# Patient Record
Sex: FEMALE | Race: OTHER | Employment: OTHER | URBAN - METROPOLITAN AREA
[De-identification: names, ages, dates, MRNs, and addresses within clinical notes are randomized per-mention and may not be internally consistent; named-entity substitution may affect disease eponyms.]

---

## 2017-02-06 RX ORDER — SPIRONOLACTONE 25 MG/1
TABLET ORAL
Qty: 30 TAB | Refills: 5 | Status: SHIPPED | OUTPATIENT
Start: 2017-02-06 | End: 2017-03-06 | Stop reason: SDUPTHER

## 2017-02-10 RX ORDER — FUROSEMIDE 40 MG/1
TABLET ORAL
Qty: 60 TAB | Refills: 5 | Status: SHIPPED | OUTPATIENT
Start: 2017-02-10 | End: 2017-08-05 | Stop reason: SDUPTHER

## 2017-03-06 RX ORDER — SPIRONOLACTONE 25 MG/1
TABLET ORAL
Qty: 30 TAB | Refills: 5 | Status: SHIPPED | OUTPATIENT
Start: 2017-03-06 | End: 2018-03-01 | Stop reason: SDUPTHER

## 2017-04-13 ENCOUNTER — HOSPITAL ENCOUNTER (OUTPATIENT)
Dept: LAB | Age: 82
Discharge: HOME | End: 2017-04-13
Payer: MEDICARE

## 2017-04-13 ENCOUNTER — OFFICE VISIT (OUTPATIENT)
Dept: CARDIOLOGY CLINIC | Age: 82
End: 2017-04-13

## 2017-04-13 ENCOUNTER — CLINICAL SUPPORT (OUTPATIENT)
Dept: CARDIOLOGY CLINIC | Age: 82
End: 2017-04-13

## 2017-04-13 VITALS
SYSTOLIC BLOOD PRESSURE: 144 MMHG | HEART RATE: 62 BPM | WEIGHT: 104 LBS | OXYGEN SATURATION: 92 % | BODY MASS INDEX: 21.83 KG/M2 | HEIGHT: 58 IN | DIASTOLIC BLOOD PRESSURE: 64 MMHG

## 2017-04-13 DIAGNOSIS — Z95.0 CARDIAC PACEMAKER IN SITU: ICD-10-CM

## 2017-04-13 DIAGNOSIS — R53.83 FATIGUE, UNSPECIFIED TYPE: ICD-10-CM

## 2017-04-13 DIAGNOSIS — R06.02 SHORTNESS OF BREATH: ICD-10-CM

## 2017-04-13 DIAGNOSIS — I49.5 SSS (SICK SINUS SYNDROME) (HCC): ICD-10-CM

## 2017-04-13 DIAGNOSIS — I10 ESSENTIAL HYPERTENSION: Primary | ICD-10-CM

## 2017-04-13 DIAGNOSIS — I50.32 CHRONIC DIASTOLIC CONGESTIVE HEART FAILURE (HCC): ICD-10-CM

## 2017-04-13 DIAGNOSIS — I49.5 SSS (SICK SINUS SYNDROME) (HCC): Primary | ICD-10-CM

## 2017-04-13 LAB
ANION GAP BLD CALC-SCNC: 11 MMOL/L (ref 3–18)
BNP SERPL-MCNC: 3035 PG/ML (ref 0–1800)
BUN SERPL-MCNC: 31 MG/DL (ref 7–18)
BUN/CREAT SERPL: 25 (ref 12–20)
CALCIUM SERPL-MCNC: 9 MG/DL (ref 8.5–10.1)
CHLORIDE SERPL-SCNC: 97 MMOL/L (ref 100–108)
CO2 SERPL-SCNC: 28 MMOL/L (ref 21–32)
CREAT SERPL-MCNC: 1.25 MG/DL (ref 0.6–1.3)
GLUCOSE SERPL-MCNC: 263 MG/DL (ref 74–99)
POTASSIUM SERPL-SCNC: 4.9 MMOL/L (ref 3.5–5.5)
SODIUM SERPL-SCNC: 136 MMOL/L (ref 136–145)

## 2017-04-13 PROCEDURE — 36415 COLL VENOUS BLD VENIPUNCTURE: CPT | Performed by: NURSE PRACTITIONER

## 2017-04-13 PROCEDURE — 83880 ASSAY OF NATRIURETIC PEPTIDE: CPT | Performed by: NURSE PRACTITIONER

## 2017-04-13 PROCEDURE — 80048 BASIC METABOLIC PNL TOTAL CA: CPT | Performed by: NURSE PRACTITIONER

## 2017-04-13 RX ORDER — POTASSIUM CHLORIDE 750 MG/1
10 TABLET, FILM COATED, EXTENDED RELEASE ORAL DAILY
Qty: 1 TAB | Refills: 0
Start: 2017-04-13

## 2017-04-13 NOTE — PATIENT INSTRUCTIONS
Take Lasix (furosemide) 80mg in the morning and 40mg in the evening for 2 days and then back to 40mg twice a day  Take potassium 10meq twice a day for 2 days only and then back to 1 tablet daily   All other medications to remain the same  BMP, NT pro-BNP   Follow-up with Prashanth Banegas in one week  Follow-up with Dr. Adonis Aguirre as scheduled and as needed  Weigh daily and record  Limit sodium intake to 2000mg per day  Limit fluid intake to no more than  6, eight ounce glasses of any type of fluids per day  Call if you notice sudden or progressive weight gain (3-5 pounds in 2-3 days), increasing shortness of breath, abdominal bloating, increasing lower extremity edema, inability to lie flat or on your normal number of pillows, having to sit up to catch your breath, fatigue, increased somnolence (sleeping more), poor appetite

## 2017-04-13 NOTE — PROGRESS NOTES
Herber Oates presents today for follow-up. She was supposed to follow-up with Dr. Abel Reina in 6 months but due to some scheduling conflicts this was not possible and therefore she was placed on my schedule. She was accompanied to the office by her daughter who states that Mrs. Danny Patiño has not been feeling well since this past weekend. They thought that maybe she had a cold but they have noticed some increased lower extremity edema, abdominal bloating, and a nonproductive cough. She is an 80year old female with a history of atrial fibrillation with RVR, hypertension, hyperlipidemia, and diabetes. She has a history of a syncopal episode believed to be due symptomatic bradycardia related to a combination of digoxin and atenolol which was ultimately discontinued. Prior to that, she had episodes of atrial fibrillation with rapid ventricular response which was very responsive to digoxin but it also made her bradycardic with a pause as long as 7.4 seconds noted on Holter. She refused implantable pacemaker for some time but then finally agreed and it ([employer]tronic [place] single chamber pacemaker was implanted on Oct. 31, 2013). He wore a Holter in August 2016 and showed atrial flutter 100% of recording time. Her last echocardiogram was done in November 2015 which showed a ejection fraction 55%, no wall motion abnormalities, and an RVSP between 40 and 45 mmHg. She was last seen by Dr. Abel Reina in August 2016. She denies chest pain, tightness, heaviness, and palpitations. She denies shortness of breath at rest, admits to minimal dyspnea on exertion, denies orthopnea and PND. She admits to abdominal bloating. She denies lightheadedness, dizziness, and syncope. She admits to have lower extremity edema. She denies claudication. Denies nausea, vomiting, diarrhea, fever, chills.          Past Medical History:   Diagnosis Date    Anemia     Arrhythmia     Atrial Fibrillation     Atrial fibrillation ([facility] pt refused anticoagulation    CHF (congestive heart failure) (HCC)     Coronary artery disease     Diabetes ([facility]     Diabetes mellitus ([facility]     Echocardiogram abnormal 11/20/2015    EF 55%. No WMA. Indeterminate diastolic fx. RVSP 40-45 mmHg. Mod LAE. Mild-mod NOMI. Mild-mod MR. Mod TR.      Holter monitor, abnormal 07/05/2013    Abnormal 48-hr Holter study. Atrial fibrillation w/slow ventricular rate, avg HR 50 bpm.  Hundreds of pauses > 3.5 sec, longest 7.4 sec.  HTN (hypertension)     Hyperlipidemia     Lower extremity venous duplex 12/16/2014    No DVT bilaterally. Bilateral pulsatile flow c/w elevated central venous pressure.  Paroxysmal atrial fibrillation (HCC)     [facility]     S/P cardiac pacemaker procedure 10-31-13    Implantation of MEDTRONIC single-chamber permanent pacemaker    Syncope 2/28/2011     Past Surgical History:   Procedure Laterality Date    HX HYSTERECTOMY      HX PACEMAKER       Family History   Problem Relation Age of Onset    Heart Attack Father      Social History   Substance Use Topics    Smoking status: Never Smoker    Smokeless tobacco: Never Used    Alcohol use No       No Known Allergies      Physical:  Visit Vitals    /64 (BP 1 Location: Left arm, BP Patient Position: Sitting)    Pulse 62    Ht 4' 10\" (1.473 m)    Wt 47.2 kg (104 lb)    SpO2 92%    BMI 21.74 kg/m2       Her weight is up 6 pounds since her last visit. Neck:  Supple, no JVD, no carotid bruits  CV:  Normal S1 and  S2, grade II-III/VI PHOEBE noted, no rubs or gallops noted. Lungs: Breath sounds are clear bilaterally, no rales or wheezing  Abd:  Soft, non-tender, slightly distended with good bowel sounds.   No hepatosplenomegaly  Extremities:  1+ lower extremity  edema from her shins to her feet      EKG:   Probable underlying afibrillation with demand ventricular pacing.  Intrinsic QRS with non-specific ST/T abnormality      LABS:  Lab Results   Component Value Date/Time    Sodium 134 02/24/2016 05:34 PM    Potassium 4.5 02/24/2016 05:34 PM    Chloride 99 02/24/2016 05:34 PM    CO2 27 02/24/2016 05:34 PM    Glucose 202 02/24/2016 05:34 PM    BUN 39 02/24/2016 05:34 PM    Creatinine 1.15 02/24/2016 05:34 PM     Lab Results   Component Value Date/Time    Cholesterol, total 222 02/24/2016 05:48 PM    HDL Cholesterol 48 02/24/2016 05:48 PM    LDL, calculated  02/24/2016 05:48 PM     LDL AND VLDL CHOLESTEROL NOT CALCULATED WHEN TRIGLYCERIDES >400 MG/DL OR HDL CHOLESTEROL <20 MG/DL    Triglyceride 450 02/24/2016 05:48 PM    CHOL/HDL Ratio 4.6 02/24/2016 05:48 PM     Impression / Plan:  1. Atrial fibrillation, chronic  2. Sick sinus syndrome with tachy/bryan manifestations, s/p permanent pacemaker implant on 10/31/13  3. Hypertension, blood pressure stable   4. Diabetes, recommend Hgb A1c less than 7% from cardiac standpoint  5. CHF, acute on chronic diastolic    Mrs. Estefania Stevenson was seen today an overdue 6 month follow-up. She was supposed to follow-up with Dr. Krishna Jo in 6 months but due to some scheduling conflicts this was not possible and therefore she was placed on my schedule. She was accompanied to the office by her daughter who states that Mrs. Estefania Stevenson has not been feeling well since this past weekend. They thought that maybe she had a cold but they have noticed some increased lower extremity edema, abdominal bloating, and a nonproductive cough. Her weight is up 6 pounds since her previous visit. She complains of only occasional dyspnea on exertion and denies orthopnea and PND. She does admit to some abdominal bloating and lower extremity edema. Her blood pressure and heart rate are well controlled. Her breath sounds are clear and she does exhibit some lower extremity edema. Her device was interrogated today and it showed that she has had 337 ventricular high rate episodes since August 2016.   These episodes were of short duration and were noted to be atrial fibrillation. She was instructed to take Lasix 80 mg in the morning and 40 mg in the evening for 2 days only and then resume 40 mg twice a day. She states that she has only been taking potassium 10 mEq once a day so she was instructed to increase this to 10 mEq twice a day for 2 days only and then back to her routine schedule. She was given a lab slip to have a BMP and NT proBNP done today. She was scheduled to follow-up with me in 1 week. Congestive heart failure teaching reinforced today. Advised to limit sodium intake to no more than 2000mg per day and to also watch fluid intake. Advised to weigh daily every morning and record weights. Instructed to call our office if progressive weight gain is noted over a 2 to 3 day period of time, shortness of breath increases, or if abdominal bloating, nausea, fatigue, or increased lower extremity edema is noted. She is aware that she should be weighing daily but her daughter is not sure that this is being done. She will follow-up with Dr. Tina Lin as scheduled and PRN.       Cortney Craft MSN, FNP-BC

## 2017-04-13 NOTE — MR AVS SNAPSHOT
Visit Information Date & Time Provider Department Dept. Phone Encounter #  
 4/13/2017  3:30 PM Ayala Landrum NP [facility] [address] [id number] Your Appointments 4/21/2017  3:00 PM  
Follow Up with Ayala Landrum NP [facility] ([address]) Appt Note: 1 week f/u CHF  
 [place] [id number]  
[phone] [address] [facility] [address] 6/19/2017  9:20 AM  
Follow Up with Dhara So DO [facility] ([address]) Appt Note: dx f/u from [facility] visit [place] [id number] [phone] [address] [facility] [address] Upcoming Health Maintenance Date Due  
 FOOT EXAM Q1 11/26/1940 MICROALBUMIN Q1 11/26/1940 EYE EXAM RETINAL OR DILATED Q1 11/26/1940 DTaP/Tdap/Td series (1 - Tdap) 11/26/1951 ZOSTER VACCINE AGE 60> 11/26/1990 GLAUCOMA SCREENING Q2Y 11/26/1995 OSTEOPOROSIS SCREENING (DEXA) 11/26/1995 Pneumococcal 65+ Low/Medium Risk (1 of 2 - PCV13) 11/26/1995 MEDICARE YEARLY EXAM 11/26/1995 INFLUENZA AGE 9 TO ADULT 8/1/2016 HEMOGLOBIN A1C Q6M 8/24/2016 LIPID PANEL Q1 2/24/2017 Allergies as of 4/13/2017  Review Complete On: 4/13/2017 By: Ayala Landrum NP No Known Allergies Current Immunizations  Never Reviewed No immunizations on file. Not reviewed this visit You Were Diagnosed With   
  
 Codes Comments Essential hypertension    -  Primary ICD-10-CM: I10 
ICD-9-CM: 401.9 Chronic diastolic congestive heart failure (HCC)     ICD-10-CM: I50.32 
ICD-9-CM: 428.32, 428.0 SSS (sick sinus syndrome) (HCC)     ICD-10-CM: I49.5 ICD-9-CM: 427.81 Cardiac pacemaker in situ     ICD-10-CM: Z95.0 ICD-9-CM: V45.01   
 Fatigue, unspecified type     ICD-10-CM: R53.83 ICD-9-CM: 780.79 Shortness of breath     ICD-10-CM: R06.02 
ICD-9-CM: 786.05 Vitals BP Pulse Height(growth percentile) Weight(growth percentile) SpO2 BMI  
 144/64 (BP 1 Location: Left arm, BP Patient Position: Sitting) 62 4' 10\" (1.473 m) 104 lb (47.2 kg) 92% 21.74 kg/m2 OB Status Smoking Status Hysterectomy Never Smoker BMI and BSA Data Body Mass Index Body Surface Area 21.74 kg/m 2 1.39 m 2 Preferred Pharmacy Pharmacy Name Phone [facility] [address] [facility] [phone] Your Updated Medication List  
  
   
This list is accurate as of: 4/13/17  4:26 PM.  Always use your most recent med list.  
  
  
  
  
 aspirin 81 mg chewable tablet Take one tab daily-START 34/3/27  
  
 folic acid 1 mg tablet Commonly known as:  Google Take 1 mg by mouth daily. furosemide 40 mg tablet Commonly known as:  LASIX TAKE ONE TABLET BY MOUTH TWICE A DAY  
  
 metoprolol tartrate 50 mg tablet Commonly known as:  LOPRESSOR  
TAKE ONE TABLET BY MOUTH TWICE A DAY  
  
 potassium chloride SR 10 mEq tablet Commonly known as:  KLOR-CON 10 Take 1 Tab by mouth daily. PREMARIN 0.3 mg tablet Generic drug:  conjugated estrogens Take 0.3 mg by mouth daily. spironolactone 25 mg tablet Commonly known as:  ALDACTONE  
TAKE ONE TABLET BY MOUTH DAILY  
  
 TRADJENTA 5 mg tablet Generic drug:  linagliptin Take 5 mg by mouth daily. We Performed the Following AMB POC EKG ROUTINE W/ 12 LEADS, INTER & REP [49325 CPT(R)] To-Do List   
 Around 04/13/2017 Lab:  METABOLIC PANEL, BASIC Around 04/13/2017 Lab:  PRO-BNP Patient Instructions Take Lasix (furosemide) 80mg in the morning and 40mg in the evening for 2 days and then back to 40mg twice a day Take potassium 10meq twice a day for 2 days only and then back to 1 tablet daily All other medications to remain the same BMP, NT pro-BNP Follow-up with Keesha Pedraza in one week Follow-up with Dr. Юлия Amaya as scheduled and as needed Weigh daily and record Limit sodium intake to 2000mg per day Limit fluid intake to no more than  6, eight ounce glasses of any type of fluids per day Call if you notice sudden or progressive weight gain (3-5 pounds in 2-3 days), increasing shortness of breath, abdominal bloating, increasing lower extremity edema, inability to lie flat or on your normal number of pillows, having to sit up to catch your breath, fatigue, increased somnolence (sleeping more), poor appetite Introducing [facility]! [employer] introduces [employer] patient portal. Now you can access parts of your medical record, email your doctor's office, and request medication refills online. 1. In your internet browser, go to [email or web address] 2. Click on the First Time User? Click Here link in the Sign In box. You will see the New Member Sign Up page. 3. Enter your [employer] Access Code exactly as it appears below. You will not need to use this code after youve completed the sign-up process. If you do not sign up before the expiration date, you must request a new code. · [employer] Access Code: [email or web address] Expires: 7/12/2017  4:16 PM 
 
4. Enter the last four digits of your Social Security Number (xxxx) and Date of Birth (mm/dd/yyyy) as indicated and click Submit. You will be taken to the next sign-up page. 5. Create a [employer] ID. This will be your [employer] login ID and cannot be changed, so think of one that is secure and easy to remember. 6. Create a [employer] password. You can change your password at any time. 7. Enter your Password Reset Question and Answer. This can be used at a later time if you forget your password. 8. Enter your e-mail address. You will receive e-mail notification when new information is available in [address] 9. Click Sign Up. You can now view and download portions of your medical record. 10. Click the Download Summary menu link to download a portable copy of your medical information. If you have questions, please visit the Frequently Asked Questions section of the [employer] website. Remember, [employer] is NOT to be used for urgent needs. For medical emergencies, dial 911. Now available from your iPhone and Android! Please provide this summary of care documentation to your next provider. Your primary care clinician is listed as Jaquelin Yu. If you have any questions after today's visit, please call [phone].

## 2017-04-13 NOTE — PROGRESS NOTES
1. Have you been to the ER, urgent care clinic since your last visit? Hospitalized since your last visit? No    2. Have you seen or consulted any other health care providers outside of the [address] [place] [address] since your last visit? Include any pap smears or colon screening.  No

## 2017-04-17 NOTE — PROGRESS NOTES
I have personally seen and evaluated the device findings. Interrogation reviewed and I agree with assessment.     Regina Fortune

## 2017-04-21 ENCOUNTER — OFFICE VISIT (OUTPATIENT)
Dept: CARDIOLOGY CLINIC | Age: 82
End: 2017-04-21

## 2017-04-21 VITALS
BODY MASS INDEX: 20.99 KG/M2 | OXYGEN SATURATION: 97 % | HEIGHT: 58 IN | WEIGHT: 100 LBS | DIASTOLIC BLOOD PRESSURE: 62 MMHG | SYSTOLIC BLOOD PRESSURE: 126 MMHG | HEART RATE: 63 BPM

## 2017-04-21 DIAGNOSIS — I50.32 CHRONIC DIASTOLIC CONGESTIVE HEART FAILURE (HCC): Primary | ICD-10-CM

## 2017-04-21 NOTE — PATIENT INSTRUCTIONS
Continue present medication regimen  Lasix 40mg twice a day (as previously taking)  Follow-up with Dr. Roc Ye as scheduled and as needed  Weigh daily and record  Limit sodium intake to 2000mg per day  Limit fluid intake to no more than  6, eight ounce glasses of any type of fluids per day  Call if you notice sudden or progressive weight gain (3-5 pounds in 2-3 days), increasing shortness of breath, abdominal bloating, increasing lower extremity edema, inability to lie flat or on your normal number of pillows, having to sit up to catch your breath, fatigue, increased somnolence (sleeping more), poor appetite

## 2017-04-21 NOTE — PROGRESS NOTES
1. Have you been to the ER, urgent care clinic since your last visit? Hospitalized since your last visit? No     2. Have you seen or consulted any other health care providers outside of the [address] [place] [address] since your last visit? Include any pap smears or colon screening.  No

## 2017-04-21 NOTE — PROGRESS NOTES
Medardo Joshua presents today for follow-up after her Lasix was increased to 80mg in the morning and 40mg in the evening for 2 days only and then back to 40mg BID. When seen last week, she complained of a non-productive cough, increased lower extremity edema, and abdominal bloating. Also, according to our scale, her weight was up 6 pounds since her previous visit. She was asked to have labs done during that visit and her NT pro-BNP was 3035 and her BUN 31, creatinine 1.25, potasium was 4.9. She is an 80year old female with a history of atrial fibrillation with RVR, hypertension, hyperlipidemia, and diabetes. She has a history of a syncopal episode believed to be due symptomatic bradycardia related to a combination of digoxin and atenolol which was ultimately discontinued. Prior to that, she had episodes of atrial fibrillation with rapid ventricular response which was very responsive to digoxin but it also made her bradycardic with a pause as long as 7.4 seconds noted on Holter. She refused implantable pacemaker for some time but then finally agreed and it ([employer] [place] single chamber pacemaker was implanted on Oct. 31, 2013). He wore a Holter in August 2016 and showed atrial flutter 100% of recording time. Her last echocardiogram was done in November 2015 which showed a ejection fraction 55%, no wall motion abnormalities, and an RVSP between 40 and 45 mmHg. She was last seen by Dr. Evangelina Glass in August 2016. Today, she states that she is feeling much better. She was accompanied by her daughter with whom she lives and she agrees that her mother is doing much better. She denies chest pain, tightness, heaviness, and palpitations. She denies shortness of breath at rest, dyspnea on exertion, denies orthopnea and PND. The cough has resolved. She states that the abdominal bloating has markedly improved. She denies lightheadedness, dizziness, and syncope.    She admits to have lower extremity edema which also has markedly improved. She denies claudication. Denies nausea, vomiting, diarrhea, fever, chills. Past Medical History:   Diagnosis Date    Anemia     Arrhythmia     Atrial Fibrillation     Atrial fibrillation [facility])     pt refused anticoagulation    CHF (congestive heart failure) (HCC)     Coronary artery disease     Diabetes ([facility]     Diabetes mellitus ([facility]     Echocardiogram abnormal 11/20/2015    EF 55%. No WMA. Indeterminate diastolic fx. RVSP 40-45 mmHg. Mod LAE. Mild-mod NOMI. Mild-mod MR. Mod TR.      Holter monitor, abnormal 07/05/2013    Abnormal 48-hr Holter study. Atrial fibrillation w/slow ventricular rate, avg HR 50 bpm.  Hundreds of pauses > 3.5 sec, longest 7.4 sec.  HTN (hypertension)     Hyperlipidemia     Lower extremity venous duplex 12/16/2014    No DVT bilaterally. Bilateral pulsatile flow c/w elevated central venous pressure.  Paroxysmal atrial fibrillation (HCC)     Pulmonary HTN ([facility]     S/P cardiac pacemaker procedure 10-31-13    Implantation of MEDTRONIC single-chamber permanent pacemaker    Syncope 2/28/2011     Past Surgical History:   Procedure Laterality Date    HX HYSTERECTOMY      HX PACEMAKER       Family History   Problem Relation Age of Onset    Heart Attack Father      Social History   Substance Use Topics    Smoking status: Never Smoker    Smokeless tobacco: Never Used    Alcohol use No       No Known Allergies      Physical:  Visit Vitals    /62    Pulse 63    Ht 4' 10\" (1.473 m)    Wt 45.4 kg (100 lb)    SpO2 97%    BMI 20.9 kg/m2       Her weight is down 4 pounds since her last visit. Neck:  Supple, no JVD, no carotid bruits  CV:  Normal S1 and  S2, grade II-III/VI PHOEBE noted, no rubs or gallops noted. Lungs: Breath sounds are clear bilaterally, no rales or wheezing  Abd:  Soft, non-tender, slightly distended with good bowel sounds.   No hepatosplenomegaly  Extremities:  Trace lower extremity  edema from her ankles to her feet      EKG:   Not done today    LABS:  Lab Results   Component Value Date/Time    Sodium 136 04/13/2017 04:55 PM    Potassium 4.9 04/13/2017 04:55 PM    Chloride 97 04/13/2017 04:55 PM    CO2 28 04/13/2017 04:55 PM    Glucose 263 04/13/2017 04:55 PM    BUN 31 04/13/2017 04:55 PM    Creatinine 1.25 04/13/2017 04:55 PM     Lab Results   Component Value Date/Time    Cholesterol, total 222 02/24/2016 05:48 PM    HDL Cholesterol 48 02/24/2016 05:48 PM    LDL, calculated  02/24/2016 05:48 PM     LDL AND VLDL CHOLESTEROL NOT CALCULATED WHEN TRIGLYCERIDES >400 MG/DL OR HDL CHOLESTEROL <20 MG/DL    Triglyceride 450 02/24/2016 05:48 PM    CHOL/HDL Ratio 4.6 02/24/2016 05:48 PM     Impression / Plan:  1. Atrial fibrillation, chronic  2. Sick sinus syndrome with tachy/bryan manifestations, s/p permanent pacemaker implant on 10/31/13  3. Hypertension, blood pressure stable   4. Diabetes, recommend Hgb A1c less than 7% from cardiac standpoint  5. CHF, acute on chronic diastolic, much improved    Mrs. Kusum Perez was seen today follow-up of CHF after being seen last week for complaints of shortness of breath, abdominal bloating, non-productive cough, and increased lower extremity edema. During that visit, she was instructed to take Lasix 80mg QAM and 40mg QPM for 2 days only and then back to Lasix 40mg BID. Her weight is now down 4 pounds since her previous visit. The dyspnea on exertion has improved and she denies orthopnea and PND. She states that the abdominal bloating and lower extremity edema have markedly improved. Her blood pressure and heart rate are well controlled. Her breath sounds are clear. She took the increased dose of Lasix as prescribed and is now back to taking Lasix 40mg BID. I asked that she remain on the lasix 40mg BID along with the remainder of her medications. Congestive heart failure teaching reinforced today.   Advised to limit sodium intake to no more than 2000mg per day and to also watch fluid intake. Advised to weigh daily every morning and record weights. Instructed to call our office if progressive weight gain is noted over a 2 to 3 day period of time, shortness of breath increases, or if abdominal bloating, nausea, fatigue, or increased lower extremity edema is noted. I asked that they notify us if they notice the symptoms returning. An echo will be ordered at that time. Her last echo was done in November 2015. She will follow-up with Dr. Evangelina Glass as scheduled and PRN.       Erich Moya MSN, FNP-BC

## 2017-04-21 NOTE — MR AVS SNAPSHOT
Visit Information Date & Time Provider Department Dept. Phone Encounter #  
 4/21/2017  3:00 PM Bret Hammer, Matt De Jesus [facility] [address] [id number] Your Appointments 6/23/2017  1:00 PM  
Follow Up with Radha Card DO [facility] ([facility]) Appt Note: dx f/u from 6mos visit; 6 month f/up [place] Funes [id number] [phone] [address] [place] [address] [address] Upcoming Health Maintenance Date Due  
 FOOT EXAM Q1 11/26/1940 MICROALBUMIN Q1 11/26/1940 EYE EXAM RETINAL OR DILATED Q1 11/26/1940 DTaP/Tdap/Td series (1 - Tdap) 11/26/1951 ZOSTER VACCINE AGE 60> 11/26/1990 GLAUCOMA SCREENING Q2Y 11/26/1995 OSTEOPOROSIS SCREENING (DEXA) 11/26/1995 Pneumococcal 65+ Low/Medium Risk (1 of 2 - PCV13) 11/26/1995 MEDICARE YEARLY EXAM 11/26/1995 INFLUENZA AGE 9 TO ADULT 8/1/2016 HEMOGLOBIN A1C Q6M 8/24/2016 LIPID PANEL Q1 2/24/2017 Allergies as of 4/21/2017  Review Complete On: 4/21/2017 By: Bret Hammer NP No Known Allergies Current Immunizations  Never Reviewed No immunizations on file. Not reviewed this visit You Were Diagnosed With   
  
 Codes Comments Chronic diastolic congestive heart failure (HCC)    -  Primary ICD-10-CM: I50.32 
ICD-9-CM: 428.32, 428.0 Vitals BP Pulse Height(growth percentile) Weight(growth percentile) SpO2 BMI  
 126/62 63 4' 10\" (1.473 m) 100 lb (45.4 kg) 97% 20.9 kg/m2 OB Status Smoking Status Hysterectomy Never Smoker Vitals History BMI and BSA Data Body Mass Index Body Surface Area  
 20.9 kg/m 2 1.36 m 2 Preferred Pharmacy Pharmacy Name Phone [facility] [address] [facility] [phone] Your Updated Medication List  
  
   
 This list is accurate as of: 4/21/17  4:09 PM.  Always use your most recent med list.  
  
  
  
  
 aspirin 81 mg chewable tablet Take one tab daily-START 80/5/82  
  
 folic acid 1 mg tablet Commonly known as:  Google Take 1 mg by mouth daily. furosemide 40 mg tablet Commonly known as:  LASIX TAKE ONE TABLET BY MOUTH TWICE A DAY  
  
 metoprolol tartrate 50 mg tablet Commonly known as:  LOPRESSOR  
TAKE ONE TABLET BY MOUTH TWICE A DAY  
  
 potassium chloride SR 10 mEq tablet Commonly known as:  KLOR-CON 10 Take 1 Tab by mouth daily. PREMARIN 0.3 mg tablet Generic drug:  conjugated estrogens Take 0.3 mg by mouth daily. spironolactone 25 mg tablet Commonly known as:  ALDACTONE  
TAKE ONE TABLET BY MOUTH DAILY  
  
 TRADJENTA 5 mg tablet Generic drug:  linagliptin Take 5 mg by mouth daily. We Performed the Following AMB POC EKG ROUTINE W/ 12 LEADS, INTER & REP [82587 CPT(R)] Patient Instructions Continue present medication regimen Lasix 40mg twice a day (as previously taking) Follow-up with Dr. Jessica Smith as scheduled and as needed Weigh daily and record Limit sodium intake to 2000mg per day Limit fluid intake to no more than  6, eight ounce glasses of any type of fluids per day Call if you notice sudden or progressive weight gain (3-5 pounds in 2-3 days), increasing shortness of breath, abdominal bloating, increasing lower extremity edema, inability to lie flat or on your normal number of pillows, having to sit up to catch your breath, fatigue, increased somnolence (sleeping more), poor appetite Introducing [facility] & HEALTH SERVICES! [employer] introduces [employer] patient portal. Now you can access parts of your medical record, email your doctor's office, and request medication refills online. 1. In your internet browser, go to [email or web address] 2. Click on the First Time User? Click Here link in the Sign In box. You will see the New Member Sign Up page. 3. Enter your Newslines Access Code exactly as it appears below. You will not need to use this code after youve completed the sign-up process. If you do not sign up before the expiration date, you must request a new code. · Newslines Access Code: [email or web address] Expires: 7/12/2017  4:16 PM 
 
4. Enter the last four digits of your Social Security Number (xxxx) and Date of Birth (mm/dd/yyyy) as indicated and click Submit. You will be taken to the next sign-up page. 5. Create a Newslines ID. This will be your Newslines login ID and cannot be changed, so think of one that is secure and easy to remember. 6. Create a Newslines password. You can change your password at any time. 7. Enter your Password Reset Question and Answer. This can be used at a later time if you forget your password. 8. Enter your e-mail address. You will receive e-mail notification when new information is available in [address] 9. Click Sign Up. You can now view and download portions of your medical record. 10. Click the Download Summary menu link to download a portable copy of your medical information. If you have questions, please visit the Frequently Asked Questions section of the Newslines website. Remember, Newslines is NOT to be used for urgent needs. For medical emergencies, dial 911. Now available from your iPhone and Android! Please provide this summary of care documentation to your next provider. Your primary care clinician is listed as Darryl Rodriguez. If you have any questions after today's visit, please call [phone].

## 2017-05-02 RX ORDER — METOPROLOL TARTRATE 50 MG/1
TABLET ORAL
Qty: 60 TAB | Refills: 6 | Status: SHIPPED | OUTPATIENT
Start: 2017-05-02 | End: 2017-12-01 | Stop reason: SDUPTHER

## 2017-05-18 NOTE — TELEPHONE ENCOUNTER
Pt son Laura Pagan called. Stating that his mother wanted you to know that taking the 3 tablets of Lasix during weight gain was very effective has really helped. Pt would like to know if its okay to do the same when the weight gain occurs again.

## 2017-05-23 NOTE — TELEPHONE ENCOUNTER
Called patient, left message on patient answering machine asking patient to return my call in regards to NP [facility] note.

## 2017-08-07 RX ORDER — FUROSEMIDE 40 MG/1
TABLET ORAL
Qty: 60 TAB | Refills: 4 | Status: SHIPPED | OUTPATIENT
Start: 2017-08-07 | End: 2018-01-05 | Stop reason: SDUPTHER

## 2017-08-15 ENCOUNTER — HOSPITAL ENCOUNTER (OUTPATIENT)
Dept: LAB | Age: 82
Discharge: HOME | End: 2017-08-15
Payer: MEDICARE

## 2017-08-15 DIAGNOSIS — E55.9 VITAMIN D DEFICIENCY: ICD-10-CM

## 2017-08-15 DIAGNOSIS — E78.2 MIXED HYPERLIPIDEMIA: ICD-10-CM

## 2017-08-15 DIAGNOSIS — I25.10 CORONARY ATHEROSCLEROSIS OF NATIVE CORONARY ARTERY: ICD-10-CM

## 2017-08-15 DIAGNOSIS — E11.9 DIABETES MELLITUS (HCC): ICD-10-CM

## 2017-08-15 DIAGNOSIS — I10 UNSPECIFIED ESSENTIAL HYPERTENSION: ICD-10-CM

## 2017-08-15 LAB
25(OH)D3 SERPL-MCNC: 41.9 NG/ML (ref 30–100)
ALBUMIN SERPL BCP-MCNC: 3.8 G/DL (ref 3.4–5)
ALBUMIN/GLOB SERPL: 0.9 {RATIO} (ref 0.8–1.7)
ALP SERPL-CCNC: 74 U/L (ref 45–117)
ALT SERPL-CCNC: 16 U/L (ref 13–56)
ANION GAP BLD CALC-SCNC: 9 MMOL/L (ref 3–18)
AST SERPL W P-5'-P-CCNC: 15 U/L (ref 15–37)
BILIRUB SERPL-MCNC: 0.6 MG/DL (ref 0.2–1)
BUN SERPL-MCNC: 29 MG/DL (ref 7–18)
BUN/CREAT SERPL: 26 (ref 12–20)
CALCIUM SERPL-MCNC: 9.1 MG/DL (ref 8.5–10.1)
CHLORIDE SERPL-SCNC: 100 MMOL/L (ref 100–108)
CHOLEST SERPL-MCNC: 267 MG/DL
CO2 SERPL-SCNC: 29 MMOL/L (ref 21–32)
CREAT SERPL-MCNC: 1.12 MG/DL (ref 0.6–1.3)
GLOBULIN SER CALC-MCNC: 4.1 G/DL (ref 2–4)
GLUCOSE SERPL-MCNC: 183 MG/DL (ref 74–99)
HBA1C MFR BLD: 9.8 % (ref 4.2–5.6)
HDLC SERPL-MCNC: 61 MG/DL (ref 40–60)
HDLC SERPL: 4.4 {RATIO} (ref 0–5)
LDLC SERPL CALC-MCNC: 131.2 MG/DL (ref 0–100)
LIPID PROFILE,FLP: ABNORMAL
POTASSIUM SERPL-SCNC: 4.2 MMOL/L (ref 3.5–5.5)
PROT SERPL-MCNC: 7.9 G/DL (ref 6.4–8.2)
SODIUM SERPL-SCNC: 138 MMOL/L (ref 136–145)
TRIGL SERPL-MCNC: 374 MG/DL (ref ?–150)
TSH SERPL DL<=0.05 MIU/L-ACNC: 4.71 UIU/ML (ref 0.36–3.74)
VLDLC SERPL CALC-MCNC: 74.8 MG/DL

## 2017-08-15 PROCEDURE — 82306 VITAMIN D 25 HYDROXY: CPT | Performed by: INTERNAL MEDICINE

## 2017-08-15 PROCEDURE — 83036 HEMOGLOBIN GLYCOSYLATED A1C: CPT | Performed by: INTERNAL MEDICINE

## 2017-08-15 PROCEDURE — 80061 LIPID PANEL: CPT | Performed by: INTERNAL MEDICINE

## 2017-08-15 PROCEDURE — 84443 ASSAY THYROID STIM HORMONE: CPT | Performed by: INTERNAL MEDICINE

## 2017-08-15 PROCEDURE — 36415 COLL VENOUS BLD VENIPUNCTURE: CPT | Performed by: INTERNAL MEDICINE

## 2017-08-15 PROCEDURE — 80053 COMPREHEN METABOLIC PANEL: CPT | Performed by: INTERNAL MEDICINE

## 2017-12-04 RX ORDER — METOPROLOL TARTRATE 50 MG/1
50 TABLET ORAL 2 TIMES DAILY
Qty: 180 TAB | Refills: 3 | Status: SHIPPED | OUTPATIENT
Start: 2017-12-04 | End: 2018-12-04 | Stop reason: SDUPTHER

## 2018-01-05 RX ORDER — FUROSEMIDE 40 MG/1
TABLET ORAL
Qty: 60 TAB | Refills: 3 | Status: SHIPPED | OUTPATIENT
Start: 2018-01-05 | End: 2018-05-01 | Stop reason: SDUPTHER

## 2018-02-16 ENCOUNTER — OFFICE VISIT (OUTPATIENT)
Dept: CARDIOLOGY CLINIC | Age: 83
End: 2018-02-16

## 2018-02-16 VITALS
HEIGHT: 58 IN | WEIGHT: 101 LBS | DIASTOLIC BLOOD PRESSURE: 66 MMHG | OXYGEN SATURATION: 98 % | BODY MASS INDEX: 21.2 KG/M2 | SYSTOLIC BLOOD PRESSURE: 122 MMHG | HEART RATE: 63 BPM

## 2018-02-16 DIAGNOSIS — I34.0 NON-RHEUMATIC MITRAL REGURGITATION: ICD-10-CM

## 2018-02-16 DIAGNOSIS — I48.20 CHRONIC ATRIAL FIBRILLATION (HCC): ICD-10-CM

## 2018-02-16 DIAGNOSIS — E78.5 HYPERLIPIDEMIA, UNSPECIFIED HYPERLIPIDEMIA TYPE: ICD-10-CM

## 2018-02-16 DIAGNOSIS — I50.32 CHRONIC DIASTOLIC CONGESTIVE HEART FAILURE (HCC): Primary | ICD-10-CM

## 2018-02-16 DIAGNOSIS — I49.5 SSS (SICK SINUS SYNDROME) (HCC): ICD-10-CM

## 2018-02-16 PROBLEM — E11.21 TYPE 2 DIABETES WITH NEPHROPATHY (HCC): Status: ACTIVE | Noted: 2018-02-16

## 2018-02-16 NOTE — PROGRESS NOTES
Review of Systems   Constitutional: Negative. Respiratory: Negative. Cardiovascular: Positive for orthopnea. Negative for chest pain, palpitations and leg swelling. Gastrointestinal: Negative. Musculoskeletal: Negative.

## 2018-02-16 NOTE — PROGRESS NOTES
1. Have you been to the ER, urgent care clinic since your last visit? Hospitalized since your last visit? No     2. Have you seen or consulted any other health care providers outside of the [address] [place] [address] since your last visit? Include any pap smears or colon screening.   No

## 2018-02-16 NOTE — PROGRESS NOTES
HPI:  I saw Sepideh Lyons in my office today in cardiovascular evaluation regarding her past problems with persistent atrial fibrillation and some degree of mitral regurgitation. Ms. Vik Shepard is a very pleasant, small, 80year old [place] female with history of persistent atrial fibrillation,sick sinus syndrome and symptomatic bradycardia, with syncope, status post a ventricular pacemaker in the past.  She had developed what appeared to be fairly severe pulmonary hypertension over the years, thought to be related to chronic diastolic heart failure in the setting of chronic atrial fibrillation, hypertension and mitral regurgitation made worse by ventricular pacing. She comes in today for the first time in approximately a year and a half relates that she is really doing very well without any cardiovascular complaints whatsoever. Encounter Diagnoses   Name Primary?  SSS (sick sinus syndrome) (HCC)     Chronic diastolic congestive heart failure (HCC) Yes    Chronic atrial fibrillation (HCC)     Non-rheumatic mitral regurgitation, moderate to severe     Hyperlipidemia, unspecified hyperlipidemia type        Discussion: This patient appears to be doing about as well as we could expect and really not have any recommendations for change at this time. It has been over 2 years since we have done an echocardiogram on her I would like to get one completed to reassess her overall LV function, mitral regurgitation, and pulmonary pressures. She does have history of hypercholesterolemia and certainly a case could be made for treating her cholesterol which was quite high with her latest lipid profile in August of 2017 showing total cholesterol 267 triglycerides of 374, HDL 61, LDL of 131.2, VLDL of 74.8, but in view of her advanced age whether we are going to really change treatment problem is really unclear and at this juncture I will leave management of that to Dr. Zen Costa.     We did check her pacemaker today and she has 4 years remaining on the battery. She did have 640 ventricular high rates above 100 in the past 12 months which would average about two per day, but these usually only lasted for seconds with the longest episode being about 15 minutes. Her blood pressure controlled today and she otherwise seems to be doing well, so I am simply get a plan to see her again in 6 months at the time of her pacer check. PCP: Ole Ma MD       Past Medical History:   Diagnosis Date    Anemia     Arrhythmia     Atrial Fibrillation     Atrial fibrillation [facility])     pt refused anticoagulation    [facility])     Coronary artery disease     Diabetes ([facility] [address] [facility]     [facility]     Echocardiogram abnormal 11/20/2015    EF 55%. No WMA. Indeterminate diastolic fx. RVSP 40-45 mmHg. Mod LAE. Mild-mod NOMI. Mild-mod MR. Mod TR.      Holter monitor, abnormal 07/05/2013    Abnormal 48-hr Holter study. Atrial fibrillation w/slow ventricular rate, avg HR 50 bpm.  Hundreds of pauses > 3.5 sec, longest 7.4 sec.  HTN (hypertension)     Hyperlipidemia     Lower extremity venous duplex 12/16/2014    No DVT bilaterally. Bilateral pulsatile flow c/w elevated central venous pressure.  Paroxysmal atrial fibrillation (HCC)     Pulmonary HTN     S/P cardiac pacemaker procedure 10-31-13    Implantation of MEDTRONIC single-chamber permanent pacemaker    Syncope 2/28/2011       Past Surgical History:   Procedure Laterality Date    HX HYSTERECTOMY      HX PACEMAKER         Current Outpatient Rx   Name  Route  Sig  Dispense  Refill    TRADJENTA 5 mg tablet    Oral    Take 5 mg by mouth daily. Dispense as written.       furosemide (LASIX) 40 mg tablet        TAKE ONE TABLET BY MOUTH TWICE A DAY    60 Tab    5      spironolactone (ALDACTONE) 25 mg tablet        TAKE ONE TABLET BY MOUTH DAILY    30 Tab    5      metoprolol (LOPRESSOR) 50 mg tablet        TAKE ONE TABLET BY MOUTH TWICE A DAY    60 Tab    5      potassium chloride SR (KLOR-CON 10) 10 mEq tablet    Oral    Take 2 Tabs by mouth two (2) times a day. 30 Tab    2      sitaGLIPtin (JANUVIA) 50 mg tablet    Oral    Take 1 Tab by mouth daily. 30 Tab    0      aspirin 81 mg chewable tablet        Take one tab daily-START 11/8/13    30 Tab    0      conjugated estrogens (PREMARIN) 0.3 mg tablet    Oral    Take 0.3 mg by mouth daily.  folic acid (FOLVITE) 1 mg tablet    Oral    Take 1 mg by mouth daily. No Known Allergies    Social History :  Social History   Substance Use Topics    Smoking status: Never Smoker    Smokeless tobacco: Never Used    Alcohol use No        Family History: family history includes Heart Attack in her father. Review of Systems  Constitutional: Negative. Respiratory: Negative. Cardiovascular: Positive for orthopnea. Negative for chest pain, palpitations and leg swelling. Gastrointestinal: Negative. Musculoskeletal: Negative. Physical Exam:    The patient is a cooperative, alert, well developed, well nourished, small  80 y.o. oriental female who is in no acute distress at the time of the examination. Visit Vitals    /66    Pulse 63    Ht 4' 10\" (1.473 m)    Wt 45.8 kg (101 lb)    SpO2 98%    BMI 21.11 kg/m2     HEENT: Conjuctiva white, mucosa moist, no pallor or cyanosis. NECK: Supple without masses, tenderness or thyromegaly. There was no jugular venous distention. Carotid are full bilaterally without bruits. CHEST: Symmetrical with good excursion. LUNGS: Clear to auscultation in all fields, except for a few rales in the left base  HEART: The apex is not displaced. There were no thrills, lifts, or heaves.  There is a normal S1 and mildly increased S2 with a grade II/VI systolic ejection murmur heard at the apex along the sternal and into the pulmonic area without appreciable diastolic murmurs, rubs, clicks, or gallops auscultated. ABDOMEN: Soft without masses, tenderness or organomegaly. EXTREMITIES: Full peripheral pulses without peripheral edema. Review of Data: See PMH and Cardiology and Imaging sections for cardiac testing  Lab Results   Component Value Date/Time    Cholesterol, total 267 (H) 08/15/2017 12:09 PM    HDL Cholesterol 61 (H) 08/15/2017 12:09 PM    LDL, calculated 131.2 (H) 08/15/2017 12:09 PM    Triglyceride 374 (H) 08/15/2017 12:09 PM    CHOL/HDL Ratio 4.4 08/15/2017 12:09 PM       Results for orders placed or performed in visit on 02/16/18   AMB POC EKG ROUTINE W/ 12 LEADS, INTER & REP     Status: None    Narrative    Ventricularly paced rhythm with a rate of 63 with underlying atrial fibrillation. Bailee Palacios D.O., F.A.C.C. [facility]  [facility] [place]  [address] [place] [address]. [address]    PLEASE NOTE:  This document has been produced using voice recognition software. Unrecognized errors in transcription may be present.

## 2018-02-16 NOTE — MR AVS SNAPSHOT
[address] [address] [address] [address] [phone] Patient: Robin Elias MRN: [id number] Visit Information Date & Time Provider Department Dept. Phone Encounter #  
 2/16/2018  3:20 PM Sruthi Palencia, [address] [place] [address] [facility] [address] [id number] Upcoming Health Maintenance Date Due  
 FOOT EXAM Q1 11/26/1940 MICROALBUMIN Q1 11/26/1940 EYE EXAM RETINAL OR DILATED Q1 11/26/1940 DTaP/Tdap/Td series (1 - Tdap) 11/26/1951 ZOSTER VACCINE AGE 60> 9/26/1990 GLAUCOMA SCREENING Q2Y 11/26/1995 OSTEOPOROSIS SCREENING (DEXA) 11/26/1995 Pneumococcal 65+ Low/Medium Risk (1 of 2 - PCV13) 11/26/1995 MEDICARE YEARLY EXAM 11/26/1995 Influenza Age 5 to Adult 8/1/2017 HEMOGLOBIN A1C Q6M 2/15/2018 LIPID PANEL Q1 8/15/2018 Allergies as of 2/16/2018  Review Complete On: 4/21/2017 By: Chiquis Wilder NP No Known Allergies Current Immunizations  Never Reviewed No immunizations on file. Not reviewed this visit You Were Diagnosed With   
  
 Codes Comments Chronic diastolic congestive heart failure (HCC)    -  Primary ICD-10-CM: I50.32 
ICD-9-CM: 428.32, 428.0 SSS (sick sinus syndrome) (HCC)     ICD-10-CM: I49.5 ICD-9-CM: 427.81 Chronic atrial fibrillation (HCC)     ICD-10-CM: G37.1 ICD-9-CM: 427.31 Hyperlipidemia, unspecified hyperlipidemia type     ICD-10-CM: E78.5 ICD-9-CM: 272.4 Vitals BP Pulse Height(growth percentile) Weight(growth percentile) SpO2 BMI  
 122/66 63 4' 10\" (1.473 m) 101 lb (45.8 kg) 98% 21.11 kg/m2 OB Status Smoking Status Hysterectomy Never Smoker Vitals History BMI and BSA Data Body Mass Index Body Surface Area  
 21.11 kg/m 2 1.37 m 2 Preferred Pharmacy Pharmacy Name Phone [facility] [address] [facility] [address] [phone] Your Updated Medication List  
  
   
This list is accurate as of: 2/16/18  4:29 PM.  Always use your most recent med list.  
  
  
  
  
 aspirin 81 mg chewable tablet Take one tab daily-START 47/9/45  
  
 folic acid 1 mg tablet Commonly known as:  Google Take 1 mg by mouth daily. furosemide 40 mg tablet Commonly known as:  LASIX TAKE ONE TABLET BY MOUTH TWICE A DAY  
  
 metoprolol tartrate 50 mg tablet Commonly known as:  LOPRESSOR Take 1 Tab by mouth two (2) times a day. potassium chloride SR 10 mEq tablet Commonly known as:  KLOR-CON 10 Take 1 Tab by mouth daily. PREMARIN 0.3 mg tablet Generic drug:  conjugated estrogens Take 0.3 mg by mouth daily. spironolactone 25 mg tablet Commonly known as:  ALDACTONE  
TAKE ONE TABLET BY MOUTH DAILY  
  
 TRADJENTA 5 mg tablet Generic drug:  linagliptin Take 5 mg by mouth daily. We Performed the Following AMB POC EKG ROUTINE W/ 12 LEADS, INTER & REP [35248 CPT(R)] To-Do List   
 02/16/2018 ECHO:  2D ECHO COMPLETE ADULT (TTE) W OR WO CONTR Introducing [facility] & HEALTH SERVICES! Antoine Estrada introduces Eyenalyze patient portal. Now you can access parts of your medical record, email your doctor's office, and request medication refills online. 1. In your internet browser, go to [email or web address] 2. Click on the First Time User? Click Here link in the Sign In box. You will see the New Member Sign Up page. 3. Enter your Eyenalyze Access Code exactly as it appears below. You will not need to use this code after youve completed the sign-up process. If you do not sign up before the expiration date, you must request a new code. · Eyenalyze Access Code: [email or web address] Expires: 5/17/2018  3:13 PM 
 
4. Enter the last four digits of your Social Security Number (xxxx) and Date of Birth (mm/dd/yyyy) as indicated and click Submit. You will be taken to the next sign-up page. 5. Create a Medical Direct Club ID. This will be your Medical Direct Club login ID and cannot be changed, so think of one that is secure and easy to remember. 6. Create a Medical Direct Club password. You can change your password at any time. 7. Enter your Password Reset Question and Answer. This can be used at a later time if you forget your password. 8. Enter your e-mail address. You will receive e-mail notification when new information is available in [address] 9. Click Sign Up. You can now view and download portions of your medical record. 10. Click the Download Summary menu link to download a portable copy of your medical information. If you have questions, please visit the Frequently Asked Questions section of the Medical Direct Club website. Remember, Medical Direct Club is NOT to be used for urgent needs. For medical emergencies, dial 911. Now available from your iPhone and Android! Please provide this summary of care documentation to your next provider. Your primary care clinician is listed as Maki Preston. If you have any questions after today's visit, please call [phone].

## 2018-03-01 RX ORDER — SPIRONOLACTONE 25 MG/1
TABLET ORAL
Qty: 30 TAB | Refills: 5 | Status: SHIPPED | OUTPATIENT
Start: 2018-03-01 | End: 2018-08-24 | Stop reason: SDUPTHER

## 2018-05-01 RX ORDER — FUROSEMIDE 40 MG/1
TABLET ORAL
Qty: 60 TAB | Refills: 6 | Status: SHIPPED | OUTPATIENT
Start: 2018-05-01 | End: 2018-12-11 | Stop reason: SDUPTHER

## 2018-08-14 ENCOUNTER — OFFICE VISIT (OUTPATIENT)
Dept: CARDIOLOGY CLINIC | Age: 83
End: 2018-08-14

## 2018-08-14 VITALS
HEART RATE: 70 BPM | BODY MASS INDEX: 18.68 KG/M2 | HEIGHT: 58 IN | WEIGHT: 89 LBS | OXYGEN SATURATION: 97 % | DIASTOLIC BLOOD PRESSURE: 80 MMHG | SYSTOLIC BLOOD PRESSURE: 130 MMHG

## 2018-08-14 DIAGNOSIS — E11.21 TYPE 2 DIABETES WITH NEPHROPATHY (HCC): ICD-10-CM

## 2018-08-14 DIAGNOSIS — I48.20 CHRONIC ATRIAL FIBRILLATION (HCC): ICD-10-CM

## 2018-08-14 DIAGNOSIS — E78.5 HYPERLIPIDEMIA, UNSPECIFIED HYPERLIPIDEMIA TYPE: ICD-10-CM

## 2018-08-14 DIAGNOSIS — Z95.0 CARDIAC PACEMAKER IN SITU: ICD-10-CM

## 2018-08-14 DIAGNOSIS — I49.5 SSS (SICK SINUS SYNDROME) (HCC): ICD-10-CM

## 2018-08-14 DIAGNOSIS — I34.0 MITRAL VALVE INSUFFICIENCY, UNSPECIFIED ETIOLOGY: ICD-10-CM

## 2018-08-14 NOTE — MR AVS SNAPSHOT
[address] [address] [address] [address] [phone] Patient: Radha Lazar MRN: [id number] Visit Information Date & Time Provider Department Dept. Phone Encounter #  
 8/14/2018  2:20 PM Marielena Collins, [address] [place] [address] [facility] [address] [id number] Upcoming Health Maintenance Date Due  
 FOOT EXAM Q1 11/26/1940 MICROALBUMIN Q1 11/26/1940 EYE EXAM RETINAL OR DILATED Q1 11/26/1940 DTaP/Tdap/Td series (1 - Tdap) 11/26/1951 ZOSTER VACCINE AGE 60> 9/26/1990 GLAUCOMA SCREENING Q2Y 11/26/1995 Bone Densitometry (Dexa) Screening 11/26/1995 Pneumococcal 65+ Low/Medium Risk (1 of 2 - PCV13) 11/26/1995 HEMOGLOBIN A1C Q6M 2/15/2018 MEDICARE YEARLY EXAM 3/14/2018 Influenza Age 5 to Adult 8/1/2018 LIPID PANEL Q1 8/15/2018 Allergies as of 8/14/2018  Review Complete On: 8/14/2018 By: Marielena Collins, DO No Known Allergies Current Immunizations  Never Reviewed No immunizations on file. Not reviewed this visit You Were Diagnosed With   
  
 Codes Comments SSS (sick sinus syndrome) (HCC)    -  Primary ICD-10-CM: I49.5 ICD-9-CM: 427.81 Cardiac pacemaker in situ     ICD-10-CM: Z95.0 ICD-9-CM: V45.01 Chronic atrial fibrillation (HCC)     ICD-10-CM: O04.2 ICD-9-CM: 427.31 Type 2 diabetes with nephropathy (HCC)     ICD-10-CM: E11.21 
ICD-9-CM: 250.40, 583.81 Hyperlipidemia, unspecified hyperlipidemia type     ICD-10-CM: E78.5 ICD-9-CM: 272.4 Mitral valve insufficiency, unspecified etiology     ICD-10-CM: I34.0 ICD-9-CM: 424.0 Vitals BP Pulse Height(growth percentile) Weight(growth percentile) SpO2 BMI  
 130/80 70 4' 10\" (1.473 m) 89 lb (40.4 kg) 97% 18.6 kg/m2 OB Status Smoking Status Hysterectomy Never Smoker BMI and BSA Data  Body Mass Index Body Surface Area  
 18.6 kg/m 2 1.29 m 2  
  
  
 Preferred Pharmacy Pharmacy Name Phone [address] [facility] [phone] Your Updated Medication List  
  
   
This list is accurate as of 8/14/18  4:09 PM.  Always use your most recent med list.  
  
  
  
  
 aspirin 81 mg chewable tablet Take one tab daily-START 98/9/25  
  
 folic acid 1 mg tablet Commonly known as:  Google Take 1 mg by mouth daily. furosemide 40 mg tablet Commonly known as:  LASIX TAKE ONE TABLET BY MOUTH TWICE A DAY  
  
 metoprolol tartrate 50 mg tablet Commonly known as:  LOPRESSOR Take 1 Tab by mouth two (2) times a day. potassium chloride SR 10 mEq tablet Commonly known as:  KLOR-CON 10 Take 1 Tab by mouth daily. PREMARIN 0.3 mg tablet Generic drug:  conjugated estrogens Take 0.3 mg by mouth daily. spironolactone 25 mg tablet Commonly known as:  ALDACTONE  
TAKE ONE TABLET BY MOUTH DAILY  
  
 TRADJENTA 5 mg tablet Generic drug:  linagliptin Take 5 mg by mouth daily. Introducing [facility]! Bonnie Torres introduces Star.me patient portal. Now you can access parts of your medical record, email your doctor's office, and request medication refills online. 1. In your internet browser, go to [email or web address] 2. Click on the First Time User? Click Here link in the Sign In box. You will see the New Member Sign Up page. 3. Enter your Star.me Access Code exactly as it appears below. You will not need to use this code after youve completed the sign-up process. If you do not sign up before the expiration date, you must request a new code. · Star.me Access Code: [email or web address] Expires: 11/12/2018  4:09 PM 
 
4. Enter the last four digits of your Social Security Number (xxxx) and Date of Birth (mm/dd/yyyy) as indicated and click Submit. You will be taken to the next sign-up page. 5. Create a Orient Green Power ID. This will be your [employer] login ID and cannot be changed, so think of one that is secure and easy to remember. 6. Create a [employer] password. You can change your password at any time. 7. Enter your Password Reset Question and Answer. This can be used at a later time if you forget your password. 8. Enter your e-mail address. You will receive e-mail notification when new information is available in [address] 9. Click Sign Up. You can now view and download portions of your medical record. 10. Click the Download Summary menu link to download a portable copy of your medical information. If you have questions, please visit the Frequently Asked Questions section of the [employer] website. Remember, [employer] is NOT to be used for urgent needs. For medical emergencies, dial 911. Now available from your iPhone and Android! Please provide this summary of care documentation to your next provider. Your primary care clinician is listed as Alan Meza. If you have any questions after today's visit, please call [phone].

## 2018-08-14 NOTE — PROGRESS NOTES
1. Have you been to the ER, urgent care clinic since your last visit? Hospitalized since your last visit?no    2. Have you seen or consulted any other health care providers outside of the [facility] since your last visit? Include any pap smears or colon screening.  no

## 2018-08-14 NOTE — PROGRESS NOTES
Review of Systems   Constitutional: Positive for weight loss. Negative for chills, fever and malaise/fatigue. Respiratory: Negative for cough, hemoptysis, shortness of breath and wheezing. Cardiovascular: Negative for chest pain, palpitations, orthopnea and leg swelling. Gastrointestinal: Negative. Musculoskeletal: Negative for falls, joint pain and myalgias. Neurological: Negative for dizziness.

## 2018-08-24 RX ORDER — SPIRONOLACTONE 25 MG/1
TABLET ORAL
Qty: 30 TAB | Refills: 5 | Status: SHIPPED | OUTPATIENT
Start: 2018-08-24 | End: 2019-02-24 | Stop reason: SDUPTHER

## 2018-10-10 ENCOUNTER — HOSPITAL ENCOUNTER (OUTPATIENT)
Dept: NON INVASIVE DIAGNOSTICS | Age: 83
Discharge: HOME | End: 2018-10-10
Attending: INTERNAL MEDICINE
Payer: MEDICARE

## 2018-10-10 VITALS
WEIGHT: 89 LBS | DIASTOLIC BLOOD PRESSURE: 80 MMHG | HEIGHT: 56 IN | BODY MASS INDEX: 20.02 KG/M2 | SYSTOLIC BLOOD PRESSURE: 130 MMHG

## 2018-10-10 DIAGNOSIS — I34.0 MITRAL VALVE INSUFFICIENCY, UNSPECIFIED ETIOLOGY: ICD-10-CM

## 2018-10-10 DIAGNOSIS — I49.5 SSS (SICK SINUS SYNDROME) (HCC): ICD-10-CM

## 2018-10-10 PROCEDURE — 93306 TTE W/DOPPLER COMPLETE: CPT

## 2018-10-11 LAB
ECHO AO ROOT DIAM: 2.82 CM
ECHO LA AREA 4C: 25.8 CM2
ECHO LA VOL 2C: 115.08 ML (ref 22–52)
ECHO LA VOL 4C: 75.97 ML (ref 22–52)
ECHO LA VOL BP: 96.94 ML (ref 22–52)
ECHO LA VOL/BSA BIPLANE: 77.02 ML/M2
ECHO LA VOLUME INDEX A2C: 91.44 ML/M2
ECHO LA VOLUME INDEX A4C: 60.36 ML/M2
ECHO LV INTERNAL DIMENSION DIASTOLIC: 4.34 CM (ref 3.9–5.3)
ECHO LV INTERNAL DIMENSION SYSTOLIC: 3.08 CM
ECHO LV IVSD: 0.7 CM (ref 0.6–0.9)
ECHO LV MASS 2D: 118 G (ref 67–162)
ECHO LV MASS INDEX 2D: 93.8 G/M2
ECHO LV POSTERIOR WALL DIASTOLIC: 0.88 CM (ref 0.6–0.9)
ECHO LVOT DIAM: 1.89 CM
ECHO LVOT PEAK GRADIENT: 1.3 MMHG
ECHO LVOT PEAK VELOCITY: 56.78 CM/S
ECHO LVOT VTI: 13.3 CM
ECHO MV REGURGITANT RADIUS PISA: 0.66 CM
ECHO TV REGURGITANT MAX VELOCITY: 316.32 CM/S
ECHO TV REGURGITANT PEAK GRADIENT: 40 MMHG

## 2018-10-11 NOTE — PROGRESS NOTES
This patient appears to be doing about as well as we could expect and really have no recommendations for change. However she has not had an echocardiogram in a few years and like to get one repeated to reassess her mitral regurgitation which right now clinically does not seem to be very severe and also to assess her overall LV function and left atrial pressures since she has been a little bit more short of breath with activity recently without any overt signs of heart failure.

## 2018-10-15 NOTE — PROGRESS NOTES
This lady's overall left ventricular function is normal and she had only moderate leak of her mitral valve and mild to moderate increase in her pulmonary pressures. This study was fairly similar to the study back in 2015, so things appear to be staying stable and there is nothing to do at this time. Please let her or her daughter know.  ES

## 2018-10-18 ENCOUNTER — TELEPHONE (OUTPATIENT)
Dept: CARDIOLOGY CLINIC | Age: 83
End: 2018-10-18

## 2018-10-18 NOTE — TELEPHONE ENCOUNTER
----- Message from Dylan Plaza DO sent at 10/15/2018  8:13 AM EDT ----- This lady's overall left ventricular function is normal and she had only moderate leak of her mitral valve and mild to moderate increase in her pulmonary pressures. This study was fairly similar to the study back in 2015, so things appear to be staying stable and there is nothing to do at this time. Please let her or her daughter know.  ES

## 2018-10-19 NOTE — TELEPHONE ENCOUNTER
Patient and son made aware of results. This has been fully explained to the patient, who indicates understanding.

## 2018-12-04 RX ORDER — METOPROLOL TARTRATE 50 MG/1
50 TABLET ORAL 2 TIMES DAILY
Qty: 180 TAB | Refills: 3 | Status: SHIPPED | OUTPATIENT
Start: 2018-12-04 | End: 2019-12-02 | Stop reason: SDUPTHER

## 2018-12-13 RX ORDER — FUROSEMIDE 40 MG/1
TABLET ORAL
Qty: 60 TAB | Refills: 5 | Status: SHIPPED | OUTPATIENT
Start: 2018-12-13 | End: 2019-03-07

## 2019-02-25 RX ORDER — SPIRONOLACTONE 25 MG/1
TABLET ORAL
Qty: 30 TAB | Refills: 6 | Status: SHIPPED | OUTPATIENT
Start: 2019-02-25 | End: 2019-02-25 | Stop reason: SDUPTHER

## 2019-02-25 RX ORDER — SPIRONOLACTONE 25 MG/1
TABLET ORAL
Qty: 30 TAB | Refills: 6 | Status: SHIPPED | OUTPATIENT
Start: 2019-02-25 | End: 2020-03-18

## 2019-03-07 ENCOUNTER — CLINICAL SUPPORT (OUTPATIENT)
Dept: CARDIOLOGY CLINIC | Age: 84
End: 2019-03-07

## 2019-03-07 ENCOUNTER — OFFICE VISIT (OUTPATIENT)
Dept: CARDIOLOGY CLINIC | Age: 84
End: 2019-03-07

## 2019-03-07 ENCOUNTER — HOSPITAL ENCOUNTER (OUTPATIENT)
Dept: LAB | Age: 84
Discharge: HOME | DRG: 377 | End: 2019-03-07
Payer: MEDICARE

## 2019-03-07 VITALS
DIASTOLIC BLOOD PRESSURE: 70 MMHG | BODY MASS INDEX: 23.39 KG/M2 | SYSTOLIC BLOOD PRESSURE: 130 MMHG | WEIGHT: 104 LBS | HEART RATE: 72 BPM | HEIGHT: 56 IN | OXYGEN SATURATION: 98 %

## 2019-03-07 DIAGNOSIS — R06.02 SOB (SHORTNESS OF BREATH): ICD-10-CM

## 2019-03-07 DIAGNOSIS — E78.5 HYPERLIPIDEMIA, UNSPECIFIED HYPERLIPIDEMIA TYPE: ICD-10-CM

## 2019-03-07 DIAGNOSIS — I50.33 ACUTE ON CHRONIC DIASTOLIC CONGESTIVE HEART FAILURE (HCC): Primary | ICD-10-CM

## 2019-03-07 DIAGNOSIS — E11.21 TYPE 2 DIABETES WITH NEPHROPATHY (HCC): ICD-10-CM

## 2019-03-07 DIAGNOSIS — I49.5 SSS (SICK SINUS SYNDROME) (HCC): Primary | ICD-10-CM

## 2019-03-07 DIAGNOSIS — Z95.0 CARDIAC PACEMAKER IN SITU: ICD-10-CM

## 2019-03-07 DIAGNOSIS — I48.20 CHRONIC ATRIAL FIBRILLATION (HCC): ICD-10-CM

## 2019-03-07 DIAGNOSIS — I49.5 SSS (SICK SINUS SYNDROME) (HCC): ICD-10-CM

## 2019-03-07 DIAGNOSIS — I34.0 MITRAL VALVE INSUFFICIENCY, UNSPECIFIED ETIOLOGY: ICD-10-CM

## 2019-03-07 LAB
ANION GAP SERPL CALC-SCNC: 9 MMOL/L (ref 3–18)
BNP SERPL-MCNC: 3100 PG/ML (ref 0–1800)
BUN SERPL-MCNC: 53 MG/DL (ref 7–18)
BUN/CREAT SERPL: 28 (ref 12–20)
CALCIUM SERPL-MCNC: 8.7 MG/DL (ref 8.5–10.1)
CHLORIDE SERPL-SCNC: 95 MMOL/L (ref 100–108)
CO2 SERPL-SCNC: 25 MMOL/L (ref 21–32)
CREAT SERPL-MCNC: 1.92 MG/DL (ref 0.6–1.3)
GLUCOSE SERPL-MCNC: 580 MG/DL (ref 74–99)
POTASSIUM SERPL-SCNC: 5.4 MMOL/L (ref 3.5–5.5)
SODIUM SERPL-SCNC: 129 MMOL/L (ref 136–145)

## 2019-03-07 PROCEDURE — 36415 COLL VENOUS BLD VENIPUNCTURE: CPT

## 2019-03-07 PROCEDURE — 83880 ASSAY OF NATRIURETIC PEPTIDE: CPT

## 2019-03-07 PROCEDURE — 80048 BASIC METABOLIC PNL TOTAL CA: CPT

## 2019-03-07 RX ORDER — FUROSEMIDE 40 MG/1
TABLET ORAL
Qty: 120 TAB | Refills: 6 | Status: ON HOLD | OUTPATIENT
Start: 2019-03-07 | End: 2019-03-12 | Stop reason: SDUPTHER

## 2019-03-07 NOTE — PROGRESS NOTES
Review of Systems   Constitutional: Negative. Respiratory: Negative. Cardiovascular: Positive for orthopnea and leg swelling. Negative for chest pain and palpitations. Gastrointestinal: Negative. Musculoskeletal: Negative. Neurological: Negative for dizziness.

## 2019-03-07 NOTE — PROGRESS NOTES
HPI:   I saw Selma Abraham in my office today in cardiovascular evaluation regarding her past problems with persistent atrial fibrillation and some degree of mitral regurgitation.  Ms. Bonifacio Mosley is a very pleasant, small, 80year old [place] female with history of persistent atrial fibrillation,sick sinus syndrome and symptomatic bradycardia, with syncope, status post a ventricular pacemaker in the past.  She had developed what appeared to be fairly severe pulmonary hypertension over the years, thought to be related to chronic diastolic heart failure in the setting of chronic atrial fibrillation, hypertension and mitral regurgitation made worse by ventricular pacing. We did do an echocardiogram on October 10, 2018 and that study demonstrated normal overall left ventricular function with moderate mitral valve regurgitation and mild to moderate pulmonary hypertension. However, study was fairly similar to her study back in 2015 suggesting that her mitral regurgitation issues were stable. She comes in today relates that he is doing reasonably well, but she has developed a lot of edema and her weight is up some 16 pounds since when I last saw her in August 2014. She does have marked lower extremity edema and does sleep with her head up at night, but is not really complaining of any significant shortness of breath with exertion or any other cardiovascular complaints. Encounter Diagnoses   Name Primary?  SSS (sick sinus syndrome) (HCC)     Cardiac pacemaker in situ     Acute on chronic diastolic congestive heart failure (HCC) Yes    Chronic atrial fibrillation (HCC)     Mitral valve insufficiency, mild to moderate     Type 2 diabetes with nephropathy (HCC)     Hyperlipidemia, unspecified hyperlipidemia type     SOB (shortness of breath) mild with exertion        Discussion: This lady has significant heart failure in her mitral regurgitation murmur sounds worse.   I am going to increase her Lasix from 40 mg twice a day to 80 mg in the morning and 40 mg in the evening and were going to get a BMP and a BNP now with further recommendations pending review of her laboratory tests and her course. I am going to have her follow-up with my nurse practitioner Kristian Georges a month and with me in 2 months. She is to call in a week with her weight and if she is not diuresing adequately I would consider adding Zaroxolyn to her regimen probably 5 mg twice a week. It should be noted that we did check her pacemaker today and her Medtronic single-chamber pacemaker has 3 years remaining on the battery but she had 483 ventricular high rates since August 18, 2018 with the longest lasting for 33 minutes and 29 seconds. PCP: Coreen Kaiser MD       Past Medical History:   Diagnosis Date    Anemia     Arrhythmia     Atrial Fibrillation     Atrial fibrillation [facility])     pt refused anticoagulation    [facility]     Coronary artery disease     Diabetes [facility]     Diabetes mellitus [facility]     Echocardiogram abnormal 11/20/2015    EF 55%. No WMA. Indeterminate diastolic fx. RVSP 40-45 mmHg. Mod LAE. Mild-mod NOMI. Mild-mod MR. Mod TR.      Holter monitor, abnormal 07/05/2013    Abnormal 48-hr Holter study. Atrial fibrillation w/slow ventricular rate, avg HR 50 bpm.  Hundreds of pauses > 3.5 sec, longest 7.4 sec.  HTN (hypertension)     Hyperlipidemia     Lower extremity venous duplex 12/16/2014    No DVT bilaterally. Bilateral pulsatile flow c/w elevated central venous pressure.     Paroxysmal atrial fibrillation (HCC)     Pulmonary HTN ([facility]     S/P cardiac pacemaker procedure 10-31-13    Implantation of MEDTRONIC single-chamber permanent pacemaker    Syncope 2/28/2011       Past Surgical History:   Procedure Laterality Date    HX HYSTERECTOMY      HX PACEMAKER         Current Outpatient Medications   Medication Sig    furosemide (LASIX) 40 mg tablet Take 80mg in the morning and 80mg in the evening    spironolactone (ALDACTONE) 25 mg tablet Take one tablet by mouth daily    metoprolol tartrate (LOPRESSOR) 50 mg tablet Take 1 Tab by mouth two (2) times a day.  potassium chloride SR (KLOR-CON 10) 10 mEq tablet Take 1 Tab by mouth daily.  TRADJENTA 5 mg tablet Take 5 mg by mouth two (2) times a day.  aspirin 81 mg chewable tablet Take one tab daily-START 11/8/13    conjugated estrogens (PREMARIN) 0.3 mg tablet Take 0.3 mg by mouth daily.  folic acid (FOLVITE) 1 mg tablet Take 1 mg by mouth daily. No current facility-administered medications for this visit. No Known Allergies    Social History :  Social History     Tobacco Use    Smoking status: Never Smoker    Smokeless tobacco: Never Used   Substance Use Topics    Alcohol use: No        Family History: family history includes Heart Attack in her father. Review of Systems   Constitutional: Negative. Respiratory: Negative. Cardiovascular: Positive for orthopnea and leg swelling. Negative for chest pain and palpitations. Gastrointestinal: Negative. Musculoskeletal: Negative. Neurological: Negative for dizziness. Physical Exam:    The patient is a cooperative, alert, well developed, well nourished, small  80 y.o. oriental female who is in no acute distress at the time of the examination. Visit Vitals  /70   Pulse 72   Ht 4' 8\" (1.422 m)   Wt 47.2 kg (104 lb)   SpO2 98%   BMI 23.32 kg/m²     HEENT: Conjuctiva white, mucosa moist, no pallor or cyanosis. NECK: Supple without masses, tenderness or thyromegaly. There was jugular venous distention to the angle of the jaw with the patient sitting up. Carotid are full bilaterally without bruits. CHEST: Symmetrical with good excursion. LUNGS: Clear to auscultation in all fields, except for a few rales in the left base  HEART: The apex is not displaced. There were no thrills, lifts, or heaves.  There is a normal S1 and mildly increased S2 with a grade II/VI systolic ejection murmur heard at the apex along the sternal and into the pulmonic area as well as a grade II/VI apical plateau shaped murmur with some radiation to the axilla without appreciable diastolic murmurs, rubs, clicks, or gallops auscultated. ABDOMEN: Soft without masses, tenderness or organomegaly. EXTREMITIES: Full peripheral pulses without peripheral edema. Review of Data: See PMH and Cardiology and Imaging sections for cardiac testing  Lab Results   Component Value Date/Time    Cholesterol, total 267 (H) 08/15/2017 12:09 PM    HDL Cholesterol 61 (H) 08/15/2017 12:09 PM    LDL, calculated 131.2 (H) 08/15/2017 12:09 PM    Triglyceride 374 (H) 08/15/2017 12:09 PM    CHOL/HDL Ratio 4.4 08/15/2017 12:09 PM       Results for orders placed or performed in visit on 02/16/18   AMB POC EKG ROUTINE W/ 12 LEADS, INTER & REP     Status: None    Narrative    Ventricularly paced rhythm with a rate of 63 with underlying atrial fibrillation. Linda Carbajal D.O., F.A.C.C. [facility]  [facility]  [address] [place] [address]. [address]    PLEASE NOTE:  This document has been produced using voice recognition software. Unrecognized errors in transcription may be present.

## 2019-03-07 NOTE — PROGRESS NOTES
I have personally seen and evaluated the device findings. Interrogation reviewed and I agree with assessment.     Louise Sawyer

## 2019-03-07 NOTE — PATIENT INSTRUCTIONS
Blood work (Bmp, bnp)  Weigh daily and report weeks in 1 week  Increase lasix to 80mg in the morning and 80mg in the evening   Follow up NP Rosalia Lau 1 month  Follow up Dr. Abdias Hill 2months

## 2019-03-07 NOTE — PROGRESS NOTES
Maileta Monday presents today for   Chief Complaint   Patient presents with    Follow-up     6 month        Cruzita Monday preferred language for health care discussion is english/other. Is someone accompanying this pt? Yes, daughter     Is the patient using any DME equipment during OV? No     Depression Screening:  3 most recent PHQ Screens 3/5/2014   Little interest or pleasure in doing things Not at all   Feeling down, depressed, irritable, or hopeless Not at all   Total Score PHQ 2 0       Learning Assessment:  Learning Assessment 2/16/2018   PRIMARY LEARNER Patient   HIGHEST LEVEL OF EDUCATION - PRIMARY LEARNER  -   BARRIERS PRIMARY LEARNER -   Jose [address] LEVEL OF EDUCATION -   [address] -    NEED -   LEARNER PREFERENCE PRIMARY DEMONSTRATION     -     -   ANSWERED BY Patient     -   RELATIONSHIP SELF       Abuse Screening:  No flowsheet data found. Fall Risk  Fall Risk Assessment, last 12 mths 3/5/2014   Able to walk? Yes   Fall in past 12 months? No       Pt currently taking Antiplatelet therapy? ASA    Coordination of Care:  1. Have you been to the ER, urgent care clinic since your last visit? Hospitalized since your last visit? No     2. Have you seen or consulted any other health care providers outside of the [address] [place] [address] since your last visit? Include any pap smears or colon screening.  No

## 2019-03-08 ENCOUNTER — TELEPHONE (OUTPATIENT)
Dept: CARDIOLOGY CLINIC | Age: 84
End: 2019-03-08

## 2019-03-08 ENCOUNTER — APPOINTMENT (OUTPATIENT)
Dept: GENERAL RADIOLOGY | Age: 84
DRG: 377 | End: 2019-03-08
Attending: PHYSICIAN ASSISTANT
Payer: MEDICARE

## 2019-03-08 ENCOUNTER — HOSPITAL ENCOUNTER (INPATIENT)
Age: 84
LOS: 3 days | Discharge: HOME | DRG: 377 | End: 2019-03-12
Attending: EMERGENCY MEDICINE | Admitting: HOSPITALIST
Payer: MEDICARE

## 2019-03-08 DIAGNOSIS — N39.0 URINARY TRACT INFECTION WITH HEMATURIA, SITE UNSPECIFIED: ICD-10-CM

## 2019-03-08 DIAGNOSIS — I48.20 CHRONIC ATRIAL FIBRILLATION (HCC): ICD-10-CM

## 2019-03-08 DIAGNOSIS — K62.5 RECTAL BLEEDING: ICD-10-CM

## 2019-03-08 DIAGNOSIS — E11.21 TYPE 2 DIABETES WITH NEPHROPATHY (HCC): ICD-10-CM

## 2019-03-08 DIAGNOSIS — R31.9 URINARY TRACT INFECTION WITH HEMATURIA, SITE UNSPECIFIED: ICD-10-CM

## 2019-03-08 DIAGNOSIS — D64.9 ANEMIA, UNSPECIFIED TYPE: Primary | ICD-10-CM

## 2019-03-08 LAB
ALBUMIN SERPL-MCNC: 3.2 G/DL (ref 3.4–5)
ALBUMIN/GLOB SERPL: 0.8 {RATIO} (ref 0.8–1.7)
ALP SERPL-CCNC: 82 U/L (ref 45–117)
ALT SERPL-CCNC: 19 U/L (ref 13–56)
ANION GAP SERPL CALC-SCNC: 11 MMOL/L (ref 3–18)
APPEARANCE UR: ABNORMAL
AST SERPL-CCNC: 31 U/L (ref 15–37)
BACTERIA URNS QL MICRO: ABNORMAL /HPF
BASOPHILS # BLD: 0 K/UL (ref 0–0.1)
BASOPHILS NFR BLD: 0 % (ref 0–2)
BILIRUB SERPL-MCNC: 0.4 MG/DL (ref 0.2–1)
BILIRUB UR QL: NEGATIVE
BNP SERPL-MCNC: 2836 PG/ML (ref 0–1800)
BUN SERPL-MCNC: 53 MG/DL (ref 7–18)
BUN/CREAT SERPL: 30 (ref 12–20)
CALCIUM SERPL-MCNC: 8.7 MG/DL (ref 8.5–10.1)
CHLORIDE SERPL-SCNC: 96 MMOL/L (ref 100–108)
CO2 SERPL-SCNC: 26 MMOL/L (ref 21–32)
COLOR UR: ABNORMAL
CREAT SERPL-MCNC: 1.79 MG/DL (ref 0.6–1.3)
DIFFERENTIAL METHOD BLD: ABNORMAL
EOSINOPHIL # BLD: 0.1 K/UL (ref 0–0.4)
EOSINOPHIL NFR BLD: 1 % (ref 0–5)
EPITH CASTS URNS QL MICRO: ABNORMAL /LPF (ref 0–5)
ERYTHROCYTE [DISTWIDTH] IN BLOOD BY AUTOMATED COUNT: 15.1 % (ref 11.6–14.5)
GLOBULIN SER CALC-MCNC: 4.2 G/DL (ref 2–4)
GLUCOSE BLD STRIP.AUTO-MCNC: 446 MG/DL (ref 70–110)
GLUCOSE SERPL-MCNC: 374 MG/DL (ref 74–99)
GLUCOSE UR STRIP.AUTO-MCNC: 250 MG/DL
HCT VFR BLD AUTO: 23.2 % (ref 35–45)
HGB BLD-MCNC: 7.2 G/DL (ref 12–16)
HGB UR QL STRIP: ABNORMAL
KETONES UR QL STRIP.AUTO: NEGATIVE MG/DL
LEUKOCYTE ESTERASE UR QL STRIP.AUTO: ABNORMAL
LYMPHOCYTES # BLD: 1.5 K/UL (ref 0.9–3.6)
LYMPHOCYTES NFR BLD: 18 % (ref 21–52)
MCH RBC QN AUTO: 33.5 PG (ref 24–34)
MCHC RBC AUTO-ENTMCNC: 31 G/DL (ref 31–37)
MCV RBC AUTO: 107.9 FL (ref 74–97)
MONOCYTES # BLD: 0.8 K/UL (ref 0.05–1.2)
MONOCYTES NFR BLD: 10 % (ref 3–10)
NEUTS SEG # BLD: 5.6 K/UL (ref 1.8–8)
NEUTS SEG NFR BLD: 71 % (ref 40–73)
NITRITE UR QL STRIP.AUTO: NEGATIVE
PH UR STRIP: 7 [PH] (ref 5–8)
PLATELET # BLD AUTO: 255 K/UL (ref 135–420)
PMV BLD AUTO: 9.1 FL (ref 9.2–11.8)
POTASSIUM SERPL-SCNC: 5.1 MMOL/L (ref 3.5–5.5)
PROT SERPL-MCNC: 7.4 G/DL (ref 6.4–8.2)
PROT UR STRIP-MCNC: NEGATIVE MG/DL
RBC # BLD AUTO: 2.15 M/UL (ref 4.2–5.3)
RBC #/AREA URNS HPF: ABNORMAL /HPF (ref 0–5)
SODIUM SERPL-SCNC: 133 MMOL/L (ref 136–145)
SP GR UR REFRACTOMETRY: 1.01 (ref 1–1.03)
UROBILINOGEN UR QL STRIP.AUTO: 0.2 EU/DL (ref 0.2–1)
WBC # BLD AUTO: 7.9 K/UL (ref 4.6–13.2)
WBC URNS QL MICRO: ABNORMAL /HPF (ref 0–4)

## 2019-03-08 PROCEDURE — 83880 ASSAY OF NATRIURETIC PEPTIDE: CPT

## 2019-03-08 PROCEDURE — 99285 EMERGENCY DEPT VISIT HI MDM: CPT

## 2019-03-08 PROCEDURE — 82728 ASSAY OF FERRITIN: CPT

## 2019-03-08 PROCEDURE — 86900 BLOOD TYPING SEROLOGIC ABO: CPT

## 2019-03-08 PROCEDURE — 93005 ELECTROCARDIOGRAM TRACING: CPT

## 2019-03-08 PROCEDURE — 80053 COMPREHEN METABOLIC PANEL: CPT

## 2019-03-08 PROCEDURE — 96374 THER/PROPH/DIAG INJ IV PUSH: CPT

## 2019-03-08 PROCEDURE — 81001 URINALYSIS AUTO W/SCOPE: CPT

## 2019-03-08 PROCEDURE — 77030013169 SET IV BLD ICUM -A

## 2019-03-08 PROCEDURE — 86923 COMPATIBILITY TEST ELECTRIC: CPT

## 2019-03-08 PROCEDURE — 83540 ASSAY OF IRON: CPT

## 2019-03-08 PROCEDURE — 82962 GLUCOSE BLOOD TEST: CPT

## 2019-03-08 PROCEDURE — 85025 COMPLETE CBC W/AUTO DIFF WBC: CPT

## 2019-03-08 PROCEDURE — 96361 HYDRATE IV INFUSION ADD-ON: CPT

## 2019-03-08 PROCEDURE — 74011250636 HC RX REV CODE- 250/636: Performed by: PHYSICIAN ASSISTANT

## 2019-03-08 PROCEDURE — 71046 X-RAY EXAM CHEST 2 VIEWS: CPT

## 2019-03-08 RX ORDER — CEFTRIAXONE 1 G/1
1 INJECTION, POWDER, FOR SOLUTION INTRAMUSCULAR; INTRAVENOUS
Status: COMPLETED | OUTPATIENT
Start: 2019-03-08 | End: 2019-03-08

## 2019-03-08 RX ADMIN — CEFTRIAXONE 1 G: 1 INJECTION, POWDER, FOR SOLUTION INTRAMUSCULAR; INTRAVENOUS at 22:46

## 2019-03-08 RX ADMIN — SODIUM CHLORIDE 250 ML: 9 INJECTION, SOLUTION INTRAVENOUS at 21:46

## 2019-03-08 NOTE — TELEPHONE ENCOUNTER
I called and discussed the laboratory results with the patient's daughter. The patient has marked increase in glucose and worsening renal function in the setting of acute renal failure due to vascular contraction and she also has heart failure which is going to be difficult to treat on OPD basis. I would recommend ER evaluation and admission for treatment of both heart failure and uncontrolled diabetes which will require fluids and IV insulin as well as ultimately slow diuresis for the heart failure. Just be sure that the PCP gets copy of these labs and let them know we recommended that she go to the ER.  ES

## 2019-03-08 NOTE — ED TRIAGE NOTES
The patient presents for evaluation of hyperglycemia. States, \"it's normally in the 400s, but today it was over 500. \"  Denies any complaints.

## 2019-03-09 PROBLEM — K92.2 GI (GASTROINTESTINAL BLEED): Status: ACTIVE | Noted: 2019-03-09

## 2019-03-09 PROBLEM — D64.9 ANEMIA: Status: ACTIVE | Noted: 2019-03-09

## 2019-03-09 LAB
ALBUMIN SERPL-MCNC: 2.7 G/DL (ref 3.4–5)
ALBUMIN/GLOB SERPL: 0.8 {RATIO} (ref 0.8–1.7)
ALP SERPL-CCNC: 64 U/L (ref 45–117)
ALT SERPL-CCNC: 15 U/L (ref 13–56)
ANION GAP SERPL CALC-SCNC: 9 MMOL/L (ref 3–18)
AST SERPL-CCNC: 20 U/L (ref 15–37)
ATRIAL RATE: 65 BPM
BASOPHILS # BLD: 0 K/UL (ref 0–0.1)
BASOPHILS # BLD: 0.1 K/UL (ref 0–0.1)
BASOPHILS NFR BLD: 0 % (ref 0–2)
BASOPHILS NFR BLD: 1 % (ref 0–2)
BILIRUB SERPL-MCNC: 0.4 MG/DL (ref 0.2–1)
BUN SERPL-MCNC: 49 MG/DL (ref 7–18)
BUN/CREAT SERPL: 36 (ref 12–20)
CALCIUM SERPL-MCNC: 8.4 MG/DL (ref 8.5–10.1)
CALCULATED R AXIS, ECG10: -64 DEGREES
CALCULATED T AXIS, ECG11: 97 DEGREES
CHLORIDE SERPL-SCNC: 102 MMOL/L (ref 100–108)
CO2 SERPL-SCNC: 27 MMOL/L (ref 21–32)
CREAT SERPL-MCNC: 1.38 MG/DL (ref 0.6–1.3)
DIAGNOSIS, 93000: NORMAL
DIFFERENTIAL METHOD BLD: ABNORMAL
DIFFERENTIAL METHOD BLD: ABNORMAL
EOSINOPHIL # BLD: 0.1 K/UL (ref 0–0.4)
EOSINOPHIL # BLD: 0.1 K/UL (ref 0–0.4)
EOSINOPHIL NFR BLD: 1 % (ref 0–5)
EOSINOPHIL NFR BLD: 1 % (ref 0–5)
ERYTHROCYTE [DISTWIDTH] IN BLOOD BY AUTOMATED COUNT: 15.1 % (ref 11.6–14.5)
ERYTHROCYTE [DISTWIDTH] IN BLOOD BY AUTOMATED COUNT: 21.4 % (ref 11.6–14.5)
EST. AVERAGE GLUCOSE BLD GHB EST-MCNC: 217 MG/DL
FERRITIN SERPL-MCNC: 106 NG/ML (ref 8–388)
GLOBULIN SER CALC-MCNC: 3.6 G/DL (ref 2–4)
GLUCOSE BLD STRIP.AUTO-MCNC: 137 MG/DL (ref 70–110)
GLUCOSE BLD STRIP.AUTO-MCNC: 138 MG/DL (ref 70–110)
GLUCOSE BLD STRIP.AUTO-MCNC: 205 MG/DL (ref 70–110)
GLUCOSE BLD STRIP.AUTO-MCNC: 271 MG/DL (ref 70–110)
GLUCOSE BLD STRIP.AUTO-MCNC: 293 MG/DL (ref 70–110)
GLUCOSE SERPL-MCNC: 187 MG/DL (ref 74–99)
HBA1C MFR BLD: 9.2 % (ref 4.2–5.6)
HCT VFR BLD AUTO: 20.7 % (ref 35–45)
HCT VFR BLD AUTO: 24.3 % (ref 35–45)
HCT VFR BLD AUTO: 24.6 % (ref 35–45)
HCT VFR BLD AUTO: 24.9 % (ref 35–45)
HGB BLD-MCNC: 6.4 G/DL (ref 12–16)
HGB BLD-MCNC: 7.7 G/DL (ref 12–16)
HGB BLD-MCNC: 7.8 G/DL (ref 12–16)
HGB BLD-MCNC: 8.1 G/DL (ref 12–16)
IRON SATN MFR SERPL: 15 %
IRON SERPL-MCNC: 64 UG/DL (ref 50–175)
LYMPHOCYTES # BLD: 1.2 K/UL (ref 0.9–3.6)
LYMPHOCYTES # BLD: 1.5 K/UL (ref 0.9–3.6)
LYMPHOCYTES NFR BLD: 19 % (ref 21–52)
LYMPHOCYTES NFR BLD: 22 % (ref 21–52)
MCH RBC QN AUTO: 33 PG (ref 24–34)
MCH RBC QN AUTO: 33.2 PG (ref 24–34)
MCHC RBC AUTO-ENTMCNC: 30.9 G/DL (ref 31–37)
MCHC RBC AUTO-ENTMCNC: 32.9 G/DL (ref 31–37)
MCV RBC AUTO: 100.8 FL (ref 74–97)
MCV RBC AUTO: 106.7 FL (ref 74–97)
MONOCYTES # BLD: 0.8 K/UL (ref 0.05–1.2)
MONOCYTES # BLD: 0.9 K/UL (ref 0.05–1.2)
MONOCYTES NFR BLD: 13 % (ref 3–10)
MONOCYTES NFR BLD: 13 % (ref 3–10)
NEUTS SEG # BLD: 4.1 K/UL (ref 1.8–8)
NEUTS SEG # BLD: 4.1 K/UL (ref 1.8–8)
NEUTS SEG NFR BLD: 64 % (ref 40–73)
NEUTS SEG NFR BLD: 66 % (ref 40–73)
PLATELET # BLD AUTO: 200 K/UL (ref 135–420)
PLATELET # BLD AUTO: 219 K/UL (ref 135–420)
PLATELET COMMENTS,PCOM: ABNORMAL
PMV BLD AUTO: 9.1 FL (ref 9.2–11.8)
PMV BLD AUTO: 9.1 FL (ref 9.2–11.8)
POTASSIUM SERPL-SCNC: 4.1 MMOL/L (ref 3.5–5.5)
PROT SERPL-MCNC: 6.3 G/DL (ref 6.4–8.2)
Q-T INTERVAL, ECG07: 496 MS
QRS DURATION, ECG06: 136 MS
QTC CALCULATION (BEZET), ECG08: 499 MS
RBC # BLD AUTO: 1.94 M/UL (ref 4.2–5.3)
RBC # BLD AUTO: 2.44 M/UL (ref 4.2–5.3)
RBC MORPH BLD: ABNORMAL
RBC MORPH BLD: ABNORMAL
SODIUM SERPL-SCNC: 138 MMOL/L (ref 136–145)
TIBC SERPL-MCNC: 437 UG/DL (ref 250–450)
VENTRICULAR RATE, ECG03: 61 BPM
WBC # BLD AUTO: 6.3 K/UL (ref 4.6–13.2)
WBC # BLD AUTO: 6.6 K/UL (ref 4.6–13.2)

## 2019-03-09 PROCEDURE — 85025 COMPLETE CBC W/AUTO DIFF WBC: CPT

## 2019-03-09 PROCEDURE — 74011000250 HC RX REV CODE- 250: Performed by: INTERNAL MEDICINE

## 2019-03-09 PROCEDURE — 82962 GLUCOSE BLOOD TEST: CPT

## 2019-03-09 PROCEDURE — 30233N1 TRANSFUSION OF NONAUTOLOGOUS RED BLOOD CELLS INTO PERIPHERAL VEIN, PERCUTANEOUS APPROACH: ICD-10-PCS | Performed by: HOSPITALIST

## 2019-03-09 PROCEDURE — 85018 HEMOGLOBIN: CPT

## 2019-03-09 PROCEDURE — 83036 HEMOGLOBIN GLYCOSYLATED A1C: CPT

## 2019-03-09 PROCEDURE — 80053 COMPREHEN METABOLIC PANEL: CPT

## 2019-03-09 PROCEDURE — 36415 COLL VENOUS BLD VENIPUNCTURE: CPT

## 2019-03-09 PROCEDURE — 36430 TRANSFUSION BLD/BLD COMPNT: CPT

## 2019-03-09 PROCEDURE — 74011636637 HC RX REV CODE- 636/637: Performed by: HOSPITALIST

## 2019-03-09 PROCEDURE — 65660000000 HC RM CCU STEPDOWN

## 2019-03-09 PROCEDURE — 74011250636 HC RX REV CODE- 250/636: Performed by: INTERNAL MEDICINE

## 2019-03-09 PROCEDURE — 87086 URINE CULTURE/COLONY COUNT: CPT

## 2019-03-09 PROCEDURE — P9016 RBC LEUKOCYTES REDUCED: HCPCS

## 2019-03-09 PROCEDURE — 74011250637 HC RX REV CODE- 250/637: Performed by: HOSPITALIST

## 2019-03-09 RX ORDER — SODIUM CHLORIDE 9 MG/ML
250 INJECTION, SOLUTION INTRAVENOUS AS NEEDED
Status: DISCONTINUED | OUTPATIENT
Start: 2019-03-09 | End: 2019-03-12 | Stop reason: HOSPADM

## 2019-03-09 RX ORDER — ACETAMINOPHEN 325 MG/1
650 TABLET ORAL
Status: DISCONTINUED | OUTPATIENT
Start: 2019-03-09 | End: 2019-03-12 | Stop reason: HOSPADM

## 2019-03-09 RX ORDER — INSULIN LISPRO 100 [IU]/ML
4 INJECTION, SOLUTION INTRAVENOUS; SUBCUTANEOUS EVERY 6 HOURS
Status: DISCONTINUED | OUTPATIENT
Start: 2019-03-09 | End: 2019-03-11

## 2019-03-09 RX ORDER — GLIPIZIDE 2.5 MG/1
2.5 TABLET, EXTENDED RELEASE ORAL DAILY
COMMUNITY
End: 2019-03-12

## 2019-03-09 RX ORDER — MAGNESIUM SULFATE 100 %
4 CRYSTALS MISCELLANEOUS AS NEEDED
Status: DISCONTINUED | OUTPATIENT
Start: 2019-03-09 | End: 2019-03-12 | Stop reason: HOSPADM

## 2019-03-09 RX ORDER — INSULIN GLARGINE 100 [IU]/ML
10 INJECTION, SOLUTION SUBCUTANEOUS DAILY
Status: DISCONTINUED | OUTPATIENT
Start: 2019-03-09 | End: 2019-03-12 | Stop reason: HOSPADM

## 2019-03-09 RX ORDER — METOPROLOL TARTRATE 50 MG/1
50 TABLET ORAL 2 TIMES DAILY
Status: DISCONTINUED | OUTPATIENT
Start: 2019-03-09 | End: 2019-03-12 | Stop reason: HOSPADM

## 2019-03-09 RX ORDER — INSULIN LISPRO 100 [IU]/ML
INJECTION, SOLUTION INTRAVENOUS; SUBCUTANEOUS EVERY 6 HOURS
Status: DISCONTINUED | OUTPATIENT
Start: 2019-03-09 | End: 2019-03-11

## 2019-03-09 RX ORDER — DEXTROSE 50 % IN WATER (D50W) INTRAVENOUS SYRINGE
25-50 AS NEEDED
Status: DISCONTINUED | OUTPATIENT
Start: 2019-03-09 | End: 2019-03-12 | Stop reason: HOSPADM

## 2019-03-09 RX ORDER — FOLIC ACID 1 MG/1
1 TABLET ORAL DAILY
Status: DISCONTINUED | OUTPATIENT
Start: 2019-03-09 | End: 2019-03-12 | Stop reason: HOSPADM

## 2019-03-09 RX ADMIN — INSULIN LISPRO 4 UNITS: 100 INJECTION, SOLUTION INTRAVENOUS; SUBCUTANEOUS at 12:50

## 2019-03-09 RX ADMIN — INSULIN LISPRO 4 UNITS: 100 INJECTION, SOLUTION INTRAVENOUS; SUBCUTANEOUS at 07:28

## 2019-03-09 RX ADMIN — METOPROLOL TARTRATE 50 MG: 50 TABLET ORAL at 08:28

## 2019-03-09 RX ADMIN — INSULIN GLARGINE 10 UNITS: 100 INJECTION, SOLUTION SUBCUTANEOUS at 03:28

## 2019-03-09 RX ADMIN — WATER 1 G: 1 INJECTION INTRAMUSCULAR; INTRAVENOUS; SUBCUTANEOUS at 12:49

## 2019-03-09 RX ADMIN — FOLIC ACID 1 MG: 1 TABLET ORAL at 08:28

## 2019-03-09 RX ADMIN — INSULIN LISPRO 4 UNITS: 100 INJECTION, SOLUTION INTRAVENOUS; SUBCUTANEOUS at 03:29

## 2019-03-09 RX ADMIN — INSULIN LISPRO 4 UNITS: 100 INJECTION, SOLUTION INTRAVENOUS; SUBCUTANEOUS at 18:28

## 2019-03-09 RX ADMIN — METOPROLOL TARTRATE 50 MG: 50 TABLET ORAL at 18:27

## 2019-03-09 NOTE — PROGRESS NOTES
NUTRITION    Nutrition Screen      RECOMMENDATIONS / PLAN:     - Add supplements: Gelatein BID.  - Recommend advancing diet as tolerated. - Continue RD inpatient monitoring and evaluation. NUTRITION INTERVENTIONS & DIAGNOSIS:     [x] Meals/snacks: modify composition, advance as tolerated   [x] Medical food supplement therapy: initiate     Nutrition Diagnosis: Inadequate oral intake related to decreased appetite, early satiety and fatigue as evidenced by pt skipping meals or consuming 50% or less of 1-2 meals per day for the past several months PTA. ASSESSMENT:     Admitted with hyperglycemia and anemia, started on clear liquids. Denies nausea/vomiting and reports poor appetite and has not been eating well for the past several months. Dislikes Glucerna Shake supplements- prefers clear liquid supplement and agreeable to [facility]. Average po intake adequate to meet patients estimated nutritional needs:   [] Yes     [x] No   [] Unable to determine at this time    Diet: DIET CLEAR LIQUID  DIET NPO      Food Allergies: NKFA  Current Appetite:   [] Good     [] Fair     [x] Poor     [] Other:  Appetite/meal intake prior to admission:   [] Good     [] Fair     [x] Poor     [x] Other: early satiety, decreased appetite and poor meal intake for the past several months PTA  Feeding Limitations:  [] Swallowing difficulty    [] Chewing difficulty    [] Other:  Current Meal Intake: No data found.     BM: PTA  Skin Integrity: WDL  Edema:   [] No     [x] Yes   Pertinent Medications: Reviewed: SSI, lantus, folic acid     Recent Labs     03/09/19  0547 03/08/19 2000 03/07/19  1701    133* 129*   K 4.1 5.1 5.4    96* 95*   CO2 27 26 25   * 374* 580*   BUN 49* 53* 53*   CREA 1.38* 1.79* 1.92*   CA 8.4* 8.7 8.7   ALB 2.7* 3.2*  --    SGOT 20 31  --    ALT 15 19  --        Intake/Output Summary (Last 24 hours) at 3/9/2019 1225  Last data filed at 3/9/2019 0843  Gross per 24 hour   Intake 550 ml   Output  Net 550 ml       Anthropometrics:  Ht Readings from Last 1 Encounters:   03/09/19 5' 1\" (1.549 m)     Last 3 Recorded Weights in this Encounter    03/08/19 1843 03/09/19 0337   Weight: 47.2 kg (104 lb) 44.9 kg (99 lb)     Body mass index is 18.71 kg/m². Underweight      Weight History: patient reports usual weight of 94 lb and weight gain PTA due to fluid accumulation     Weight Metrics 3/9/2019 3/8/2019 3/7/2019 10/10/2018 8/14/2018 2/16/2018 4/21/2017   Weight 99 lb - 104 lb 89 lb 89 lb 101 lb 100 lb   BMI - 18.71 kg/m2 23.32 kg/m2 19.95 kg/m2 18.6 kg/m2 21.11 kg/m2 20.9 kg/m2        Admitting Diagnosis: Anemia [D64.9]  GI (gastrointestinal bleed) [K92.2]  Pertinent PMHx: DM, atrial fibrillation, CAD, HTN, HLD    Education Needs:        [x] None identified  [] Identified - Not appropriate at this time  []  Identified and addressed - refer to education log  Learning Limitations:   [x] None identified  [] Identified    Cultural, Congregational & ethnic food preferences:  [x] None identified    [] Identified and addressed     ESTIMATED NUTRITION NEEDS:     Additional 500 kcal per day to promote weight gain   Calories: 7371-6580 kcal (MSJx1.2-1.3) based on  [x] Actual BW 45 kg     [] IBW   Protein: 36-45 gm (0.8-1 gm/kg) based on  [x] Actual BW      [] IBW   Fluid: 1 mL/kcal     MONITORING & EVALUATION:     Nutrition Goal(s):   1. Po intake of meals will meet >75% of patient estimated nutritional needs within the next 7 days. Outcome:  [] Met/Ongoing    []  Not Met    [x] New/Initial Goal   2. Weight gain of 1-2 lbs over the next 7 days.    Outcome:  [] Met/Ongoing    []  Not Met    [x] New/Initial Goal      Monitoring:   [x] Food and beverage intake   [x] Diet order   [x] Nutrition-focused physical findings   [x] Treatment/therapy   [] Weight   [] Enteral nutrition intake        Previous Recommendations (for follow-up assessments only):     []   Implemented       []   Not Implemented (RD to address)      [] No Longer Appropriate     [] No Recommendation Made     Discharge Planning: cardiac, diabetic diet   [x] Participated in care planning, discharge planning, & interdisciplinary rounds as appropriate      Jose Fenton, [address]    Pager: [phone]

## 2019-03-09 NOTE — H&P
History & Physical    Patient: Susanna Funes MRN: [id number]  CSN: [id number]    YOB: 1930  Age: 80 y.o. Sex: female      DOA: 3/8/2019    Chief Complaint   Patient presents with    High Blood Sugar          HPI:     Susanna Funes is a 80 y.o. female who has a past medical history of diabetes, valvular heart disease, pulmonary hypertension, atrial fibrillation but not on anticoagulation. Patient came to the emergency room today after receiving a call from her doctor's office that her blood sugar was greater than 500. Upon arrival to ED, she also complained of fatigue. She's also accompanied by her daughter who states that the patient has been more somnolent he has a history of dark stools but attributes it to use of magnesium and Pepto-Bismol. No gross bleeding. Hemoglobin upon presentation was 7.2 and repeat was 6.4. Patient denied any blood loss. She has had no colonoscopies and lifetime. Denied poly symptoms    Continues to have bilateral lower extremity edema, no chest pain, no orthopnea or PND    No abdominal pain, no nausea or vomiting, no recent weight loss reported. Her appetite is low      Past Medical History:   Diagnosis Date    Anemia     Arrhythmia     Atrial Fibrillation     Atrial fibrillation [facility])     pt refused anticoagulation    CHF (congestive heart failure) (HCC)     Coronary artery disease     [facility]     Diabetes mellitus ([facility]     Echocardiogram abnormal 11/20/2015    EF 55%. No WMA. Indeterminate diastolic fx. RVSP 40-45 mmHg. Mod LAE. Mild-mod NOMI. Mild-mod MR. Mod TR.      Holter monitor, abnormal 07/05/2013    Abnormal 48-hr Holter study. Atrial fibrillation w/slow ventricular rate, avg HR 50 bpm.  Hundreds of pauses > 3.5 sec, longest 7.4 sec.  HTN (hypertension)     Hyperlipidemia     Lower extremity venous duplex 12/16/2014    No DVT bilaterally. Bilateral pulsatile flow c/w elevated central venous pressure.     Paroxysmal atrial fibrillation (HCC)     [facility] HTN ([facility]     S/P cardiac pacemaker procedure 10-31-13    Implantation of MEDTRONIC single-chamber permanent pacemaker    Syncope 2/28/2011       Past Surgical History:   Procedure Laterality Date    HX HYSTERECTOMY      HX PACEMAKER         Family History   Problem Relation Age of Onset    Heart Attack Father        Social History     Socioeconomic History    Marital status:      Spouse name: Not on file    Number of children: Not on file    Years of education: Not on file    Highest education level: Not on file   Tobacco Use    Smoking status: Never Smoker    Smokeless tobacco: Never Used   Substance and Sexual Activity    Alcohol use: No       Prior to Admission medications    Medication Sig Start Date End Date Taking? Authorizing Provider   furosemide (LASIX) 40 mg tablet Take 80mg in the morning and 80mg in the evening 3/7/19   Lai Berg, DO   spironolactone (ALDACTONE) 25 mg tablet Take one tablet by mouth daily 2/25/19   Lai Berg, DO   metoprolol tartrate (LOPRESSOR) 50 mg tablet Take 1 Tab by mouth two (2) times a day. 12/4/18   Mario Dean NP   potassium chloride SR (KLOR-CON 10) 10 mEq tablet Take 1 Tab by mouth daily. 4/13/17   Mario Dean NP   TRADJENTA 5 mg tablet Take 5 mg by mouth two (2) times a day. 5/25/16   Provider, Historical   aspirin 81 mg chewable tablet Take one tab daily-START 11/8/13 11/1/13   Gamal Perdomo MD   conjugated estrogens (PREMARIN) 0.3 mg tablet Take 0.3 mg by mouth daily. Provider, Historical   folic acid (FOLVITE) 1 mg tablet Take 1 mg by mouth daily. Provider, Historical       No Known Allergies    Review of Systems    Refer to HPI for positive findings.  All other systems reviewed and are negative    Physical Exam:     Physical Exam:  Visit Vitals  /53   Pulse 60   Temp (P) 97.9 °F (36.6 °C)   Resp 16   Ht 5' (1.524 m)   Wt 47.2 kg (104 lb)   SpO2 97%   BMI 20.31 kg/m²      O2 Device: Room air    Temp (24hrs), Av.9 °F (36.6 °C), Min:97.8 °F (36.6 °C), Max:98 °F (36.7 °C)       1901 -  0700  In: 250 [I.V.:250]  Out: -      No intake/output data recorded. General:  Alert, cooperative, no distress, appears stated age. Head:  Normocephalic, without obvious abnormality, atraumatic. Eyes:  Conjunctivae/corneas clear. PERRL, EOMs intact. Nose: Nares normal. No drainage or sinus tenderness. Throat: Lips, mucosa, and tongue normal. Teeth and gums normal.   Neck: Supple, symmetrical, trachea midline, no adenopathy, thyroid: no enlargement/tenderness/nodules, no carotid bruit and no JVD. Back:   ROM normal. No CVA tenderness. Lungs:   Clear to auscultation bilaterally. Chest wall:  No tenderness or deformity. Heart:  Regular rate and rhythm, S1, S2 normal, 2/6 apical pansystolic murmur, click, rub or gallop. Abdomen: Soft, non-tender. Bowel sounds normal. No masses,  No organomegaly. Extremities: Extremities normal, atraumatic, no cyanosis, 2+ bilateral pitting edema. Pulses: 2+ and symmetric all extremities. Skin: Skin color, texture, turgor normal. No rashes or lesions   Neurologic: CNII-XII intact. No focal motor or sensory deficit.        Labs Reviewed:    Recent Results (from the past 24 hour(s))   GLUCOSE, POC    Collection Time: 19  6:46 PM   Result Value Ref Range    Glucose (POC) 446 (HH) 70 - 110 mg/dL   CBC WITH AUTOMATED DIFF    Collection Time: 19  8:00 PM   Result Value Ref Range    WBC 7.9 4.6 - 13.2 K/uL    RBC 2.15 (L) 4.20 - 5.30 M/uL    HGB 7.2 (L) 12.0 - 16.0 g/dL    HCT 23.2 (L) 35.0 - 45.0 %    .9 (H) 74.0 - 97.0 FL    MCH 33.5 24.0 - 34.0 PG    MCHC 31.0 31.0 - 37.0 g/dL    RDW 15.1 (H) 11.6 - 14.5 %    PLATELET 675 716 - 604 K/uL    MPV 9.1 (L) 9.2 - 11.8 FL    NEUTROPHILS 71 40 - 73 %    LYMPHOCYTES 18 (L) 21 - 52 %    MONOCYTES 10 3 - 10 %    EOSINOPHILS 1 0 - 5 % BASOPHILS 0 0 - 2 %    ABS. NEUTROPHILS 5.6 1.8 - 8.0 K/UL    ABS. LYMPHOCYTES 1.5 0.9 - 3.6 K/UL    ABS. MONOCYTES 0.8 0.05 - 1.2 K/UL    ABS. EOSINOPHILS 0.1 0.0 - 0.4 K/UL    ABS. BASOPHILS 0.0 0.0 - 0.1 K/UL    DF AUTOMATED     METABOLIC PANEL, COMPREHENSIVE    Collection Time: 03/08/19  8:00 PM   Result Value Ref Range    Sodium 133 (L) 136 - 145 mmol/L    Potassium 5.1 3.5 - 5.5 mmol/L    Chloride 96 (L) 100 - 108 mmol/L    CO2 26 21 - 32 mmol/L    Anion gap 11 3.0 - 18 mmol/L    Glucose 374 (H) 74 - 99 mg/dL    BUN 53 (H) 7.0 - 18 MG/DL    Creatinine 1.79 (H) 0.6 - 1.3 MG/DL    BUN/Creatinine ratio 30 (H) 12 - 20      GFR est AA 32 (L) >60 ml/min/1.73m2    GFR est non-AA 27 (L) >60 ml/min/1.73m2    Calcium 8.7 8.5 - 10.1 MG/DL    Bilirubin, total 0.4 0.2 - 1.0 MG/DL    ALT (SGPT) 19 13 - 56 U/L    AST (SGOT) 31 15 - 37 U/L    Alk.  phosphatase 82 45 - 117 U/L    Protein, total 7.4 6.4 - 8.2 g/dL    Albumin 3.2 (L) 3.4 - 5.0 g/dL    Globulin 4.2 (H) 2.0 - 4.0 g/dL    A-G Ratio 0.8 0.8 - 1.7     URINALYSIS W/ RFLX MICROSCOPIC    Collection Time: 03/08/19  8:00 PM   Result Value Ref Range    Color DARK YELLOW      Appearance TURBID      Specific gravity 1.010 1.005 - 1.030      pH (UA) 7.0 5.0 - 8.0      Protein NEGATIVE  NEG mg/dL    Glucose 250 (A) NEG mg/dL    Ketone NEGATIVE  NEG mg/dL    Bilirubin NEGATIVE  NEG      Blood MODERATE (A) NEG      Urobilinogen 0.2 0.2 - 1.0 EU/dL    Nitrites NEGATIVE  NEG      Leukocyte Esterase LARGE (A) NEG     NT-PRO BNP    Collection Time: 03/08/19  8:00 PM   Result Value Ref Range    NT pro-BNP 2,836 (H) 0 - 1,800 PG/ML   URINE MICROSCOPIC ONLY    Collection Time: 03/08/19  8:00 PM   Result Value Ref Range    WBC TOO NUMEROUS TO COUNT 0 - 4 /hpf    RBC 2 to 5 0 - 5 /hpf    Epithelial cells 2+ 0 - 5 /lpf    Bacteria 2+ (A) NEG /hpf   EKG, 12 LEAD, INITIAL    Collection Time: 03/08/19  8:05 PM   Result Value Ref Range    Ventricular Rate 61 BPM    Atrial Rate 65 BPM    QRS Duration 136 ms    Q-T Interval 496 ms    QTC Calculation (Bezet) 499 ms    Calculated R Axis -64 degrees    Calculated T Axis 97 degrees    Diagnosis       Electronic ventricular pacemaker  When compared with ECG of 20-APR-2015 23:54,  Vent. rate has decreased BY   2 BPM     TYPE & SCREEN    Collection Time: 03/08/19 11:30 PM   Result Value Ref Range    Crossmatch Expiration 03/11/2019     ABO/Rh(D) B POSITIVE     Antibody screen NEG     CALLED TO: [facility], AT 0058 ON [id number] BY CLB     Unit number [id number]     Blood component type RC LR     Unit division 00     Status of unit ISSUED     Crossmatch result Compatible    CBC WITH AUTOMATED DIFF    Collection Time: 03/09/19 12:27 AM   Result Value Ref Range    WBC 6.6 4.6 - 13.2 K/uL    RBC 1.94 (L) 4.20 - 5.30 M/uL    HGB 6.4 (L) 12.0 - 16.0 g/dL    HCT 20.7 (L) 35.0 - 45.0 %    .7 (H) 74.0 - 97.0 FL    MCH 33.0 24.0 - 34.0 PG    MCHC 30.9 (L) 31.0 - 37.0 g/dL    RDW 15.1 (H) 11.6 - 14.5 %    PLATELET 758 065 - 357 K/uL    MPV 9.1 (L) 9.2 - 11.8 FL    NEUTROPHILS 64 40 - 73 %    LYMPHOCYTES 22 21 - 52 %    MONOCYTES 13 (H) 3 - 10 %    EOSINOPHILS 1 0 - 5 %    BASOPHILS 0 0 - 2 %    ABS. NEUTROPHILS 4.1 1.8 - 8.0 K/UL    ABS. LYMPHOCYTES 1.5 0.9 - 3.6 K/UL    ABS. MONOCYTES 0.9 0.05 - 1.2 K/UL    ABS. EOSINOPHILS 0.1 0.0 - 0.4 K/UL    ABS. BASOPHILS 0.0 0.0 - 0.1 K/UL    DF AUTOMATED     GLUCOSE, POC    Collection Time: 03/09/19 12:41 AM   Result Value Ref Range    Glucose (POC) 293 (H) 70 - 110 mg/dL       Procedures/imaging: see electronic medical records for all procedures/Xrays and details which were not copied into this note but were reviewed prior to creation of Plan        Assessment/Plan     1. Symptomatic Anemia  2. Hyperglycemia >500  3. GI Bleed  4. CKD  5. Mitral Regurgitation  6. Pulmonary HTN + Moderate TR  7. Diabetes Mellitus type 2  8.  Atrial Fibrillation    Plan    - Hgb is 6.4, will transfuse one unit (started in ED)  - Anemia panel added on to patient's labs  - Hemoccult positive, will obtain GI consult  - Basal Bolus regimen with SSI, A1C ordered  - Keep NPO  - Protonix   - Will give lasix 40 mg IV after transfusion and continue with 20 mg IV BID    DVT PPX - SCDs    Full Code  Active Problems:    Anemia (3/9/2019)      GI (gastrointestinal bleed) (3/9/2019)        Nikki Holbrook MD  March 9, 2019

## 2019-03-09 NOTE — ED NOTES
TRANSFER - OUT REPORT:    Verbal report given to Xiao Carrion RN (name) on [facility] AT [place]  being transferred to  (unit) for routine progression of care       Report consisted of patients Situation, Background, Assessment and   Recommendations(SBAR). Information from the following report(s) SBAR, ED Summary, Intake/Output and MAR was reviewed with the receiving nurse. Lines:   Peripheral IV 03/08/19 Right Arm (Active)   Site Assessment Clean, dry, & intact 3/8/2019  8:00 PM   Phlebitis Assessment 0 3/8/2019  8:00 PM   Infiltration Assessment 0 3/8/2019  8:00 PM   Dressing Status Clean, dry, & intact 3/8/2019  8:00 PM   Dressing Type Tape;Transparent 3/8/2019  8:00 PM   Hub Color/Line Status Pink;Flushed;Patent 3/8/2019  8:00 PM   Action Taken Blood drawn 3/8/2019  8:00 PM   Alcohol Cap Used No 3/8/2019  8:00 PM       Peripheral IV 03/09/19 Left Forearm (Active)   Site Assessment Clean, dry, & intact 3/9/2019  2:01 AM   Phlebitis Assessment 0 3/9/2019  2:01 AM   Infiltration Assessment 0 3/9/2019  2:01 AM   Dressing Status Clean, dry, & intact 3/9/2019  2:01 AM        Opportunity for questions and clarification was provided.       Patient transported with:   Monitor

## 2019-03-09 NOTE — ED PROVIDER NOTES
EMERGENCY DEPARTMENT HISTORY AND PHYSICAL EXAM    Date: 3/8/2019  Patient Name: Rosalia Thakur    History of Presenting Illness     Chief Complaint   Patient presents with    High Blood Sugar         History Provided By: Patient    Chief Complaint: hyperglycemia  Duration: chronic  Timing:    Location: n/a  Quality:  Severity:   Modifying Factors: n/a  Associated Symptoms: leg swelling      Additional History (Context): Rosalia Thakur is a 80 y.o. female with diabetes, hypertension, renal insufficiency and pacemaker placement, CHF who presents with elevated BS. Pt here with daughter who state that pt was called by her cardiologist and told to come to ED as BS was over 500 on routine labs done in the office yesterday. Pt states she checked sugars daily and they have been in the 400s often. Takes oral meds and is compliant. Denies h/o infection, recent cough, ENT sx, abdominal pain, NVD, urinary sx, wounds, sob, cp, headache, dizziness, rectal bleeding, melena, or any other sx. No blood thinners     PCP: Britni Stephenson MD    Current Facility-Administered Medications   Medication Dose Route Frequency Provider Last Rate Last Dose    sodium chloride 0.9 % bolus infusion 250 mL  250 mL IntraVENous ONCE Claudia Pritchard PA-C        cefTRIAXone (ROCEPHIN) injection 1 g  1 g IntraVENous NOW Claudia Pritchard PA-C         Current Outpatient Medications   Medication Sig Dispense Refill    furosemide (LASIX) 40 mg tablet Take 80mg in the morning and 80mg in the evening 120 Tab 6    spironolactone (ALDACTONE) 25 mg tablet Take one tablet by mouth daily 30 Tab 6    metoprolol tartrate (LOPRESSOR) 50 mg tablet Take 1 Tab by mouth two (2) times a day. 180 Tab 3    potassium chloride SR (KLOR-CON 10) 10 mEq tablet Take 1 Tab by mouth daily. 1 Tab 0    TRADJENTA 5 mg tablet Take 5 mg by mouth two (2) times a day.       aspirin 81 mg chewable tablet Take one tab daily-START 11/8/13 30 Tab 0    conjugated estrogens (PREMARIN) 0.3 mg tablet Take 0.3 mg by mouth daily.  folic acid (FOLVITE) 1 mg tablet Take 1 mg by mouth daily. Past History     Past Medical History:  Past Medical History:   Diagnosis Date    Anemia     Arrhythmia     Atrial Fibrillation     Atrial fibrillation [facility])     pt refused anticoagulation    CHF (congestive heart failure) (HCC)     Coronary artery disease     Diabetes ([facility]     Diabetes mellitus ([facility]     Echocardiogram abnormal 11/20/2015    EF 55%. No WMA. Indeterminate diastolic fx. RVSP 40-45 mmHg. Mod LAE. Mild-mod NOMI. Mild-mod MR. Mod TR.      Holter monitor, abnormal 07/05/2013    Abnormal 48-hr Holter study. Atrial fibrillation w/slow ventricular rate, avg HR 50 bpm.  Hundreds of pauses > 3.5 sec, longest 7.4 sec.  HTN (hypertension)     Hyperlipidemia     Lower extremity venous duplex 12/16/2014    No DVT bilaterally. Bilateral pulsatile flow c/w elevated central venous pressure.     Paroxysmal atrial fibrillation (HCC)     Pulmonary HTN (HCC)     S/P cardiac pacemaker procedure 10-31-13    Implantation of MEDTRONIC single-chamber permanent pacemaker    Syncope 2/28/2011       Past Surgical History:  Past Surgical History:   Procedure Laterality Date    HX HYSTERECTOMY      HX PACEMAKER         Family History:  Family History   Problem Relation Age of Onset    Heart Attack Father        Social History:  Social History     Tobacco Use    Smoking status: Never Smoker    Smokeless tobacco: Never Used   Substance Use Topics    Alcohol use: No    Drug use: Not on file       Allergies:  No Known Allergies      Review of Systems   Review of Systems  All Other Systems Negative  Physical Exam     Vitals:    03/08/19 1843 03/08/19 2000 03/08/19 2030   BP: 124/63 152/60 124/48   Pulse: 85 63 60   Resp: 16 17 14   Temp: 98 °F (36.7 °C)     SpO2: 97% 99% 96%   Weight: 47.2 kg (104 lb)     Height: 5' (1.524 m)       Physical Exam      Diagnostic Study Results     Labs - Recent Results (from the past 12 hour(s))   GLUCOSE, POC    Collection Time: 03/08/19  6:46 PM   Result Value Ref Range    Glucose (POC) 446 (HH) 70 - 110 mg/dL   CBC WITH AUTOMATED DIFF    Collection Time: 03/08/19  8:00 PM   Result Value Ref Range    WBC 7.9 4.6 - 13.2 K/uL    RBC 2.15 (L) 4.20 - 5.30 M/uL    HGB 7.2 (L) 12.0 - 16.0 g/dL    HCT 23.2 (L) 35.0 - 45.0 %    .9 (H) 74.0 - 97.0 FL    MCH 33.5 24.0 - 34.0 PG    MCHC 31.0 31.0 - 37.0 g/dL    RDW 15.1 (H) 11.6 - 14.5 %    PLATELET 122 655 - 223 K/uL    MPV 9.1 (L) 9.2 - 11.8 FL    NEUTROPHILS 71 40 - 73 %    LYMPHOCYTES 18 (L) 21 - 52 %    MONOCYTES 10 3 - 10 %    EOSINOPHILS 1 0 - 5 %    BASOPHILS 0 0 - 2 %    ABS. NEUTROPHILS 5.6 1.8 - 8.0 K/UL    ABS. LYMPHOCYTES 1.5 0.9 - 3.6 K/UL    ABS. MONOCYTES 0.8 0.05 - 1.2 K/UL    ABS. EOSINOPHILS 0.1 0.0 - 0.4 K/UL    ABS. BASOPHILS 0.0 0.0 - 0.1 K/UL    DF AUTOMATED     METABOLIC PANEL, COMPREHENSIVE    Collection Time: 03/08/19  8:00 PM   Result Value Ref Range    Sodium 133 (L) 136 - 145 mmol/L    Potassium 5.1 3.5 - 5.5 mmol/L    Chloride 96 (L) 100 - 108 mmol/L    CO2 26 21 - 32 mmol/L    Anion gap 11 3.0 - 18 mmol/L    Glucose 374 (H) 74 - 99 mg/dL    BUN 53 (H) 7.0 - 18 MG/DL    Creatinine 1.79 (H) 0.6 - 1.3 MG/DL    BUN/Creatinine ratio 30 (H) 12 - 20      GFR est AA 32 (L) >60 ml/min/1.73m2    GFR est non-AA 27 (L) >60 ml/min/1.73m2    Calcium 8.7 8.5 - 10.1 MG/DL    Bilirubin, total 0.4 0.2 - 1.0 MG/DL    ALT (SGPT) 19 13 - 56 U/L    AST (SGOT) 31 15 - 37 U/L    Alk.  phosphatase 82 45 - 117 U/L    Protein, total 7.4 6.4 - 8.2 g/dL    Albumin 3.2 (L) 3.4 - 5.0 g/dL    Globulin 4.2 (H) 2.0 - 4.0 g/dL    A-G Ratio 0.8 0.8 - 1.7     URINALYSIS W/ RFLX MICROSCOPIC    Collection Time: 03/08/19  8:00 PM   Result Value Ref Range    Color DARK YELLOW      Appearance TURBID      Specific gravity 1.010 1.005 - 1.030      pH (UA) 7.0 5.0 - 8.0      Protein NEGATIVE  NEG mg/dL    Glucose 250 (A) NEG mg/dL Ketone NEGATIVE  NEG mg/dL    Bilirubin NEGATIVE  NEG      Blood MODERATE (A) NEG      Urobilinogen 0.2 0.2 - 1.0 EU/dL    Nitrites NEGATIVE  NEG      Leukocyte Esterase LARGE (A) NEG     NT-PRO BNP    Collection Time: 03/08/19  8:00 PM   Result Value Ref Range    NT pro-BNP 2,836 (H) 0 - 1,800 PG/ML   URINE MICROSCOPIC ONLY    Collection Time: 03/08/19  8:00 PM   Result Value Ref Range    WBC TOO NUMEROUS TO COUNT 0 - 4 /hpf    RBC 2 to 5 0 - 5 /hpf    Epithelial cells 2+ 0 - 5 /lpf    Bacteria 2+ (A) NEG /hpf   EKG, 12 LEAD, INITIAL    Collection Time: 03/08/19  8:05 PM   Result Value Ref Range    Ventricular Rate 61 BPM    Atrial Rate 65 BPM    QRS Duration 136 ms    Q-T Interval 496 ms    QTC Calculation (Bezet) 499 ms    Calculated R Axis -64 degrees    Calculated T Axis 97 degrees    Diagnosis       Electronic ventricular pacemaker  When compared with ECG of 20-APR-2015 23:54,  Vent. rate has decreased BY   2 BPM         Radiologic Studies -   XR CHEST PA LAT   Final Result   Impression:   1. No acute infiltrate. 2.  Mild interstitial thickening which may be chronic. CT Results  (Last 48 hours)    None        CXR Results  (Last 48 hours)               03/08/19 2045  XR CHEST PA LAT Final result    Impression:  Impression:   1. No acute infiltrate. 2.  Mild interstitial thickening which may be chronic. Narrative:  EXAM: Chest Radiograph       INDICATION:        Significant cardiomegaly unchanged over multiple exams. TECHNIQUE: AP and lateral views of the chest       COMPARISON: 4/21/2015, 12/16/2014, 11/1/2013 and 10/31/2013       FINDINGS: No pneumothorax identified. Mild interstitial thickening. No   effusions identified. Significant cardiomegaly unchanged from multiple exams. A   single lead pacemaker is noted and unchanged. The pulmonary vasculature is   unremarkable. Degenerative changes of the thoracic spine.                    Medical Decision Making   I am the first provider for this patient. I reviewed the vital signs, available nursing notes, past medical history, past surgical history, family history and social history. Vital Signs-Reviewed the patient's vital signs. Pulse Oximetry Analysis - 97% on RA    Cardiac Monitor:  Rate: 60  Rhythm: NSR    EKG: Interpreted by the EP. ross   Time Interpreted: 2005   Rate: 61   Rhythm: electronic vent pacemaker   Interpretation: no stemi   Comparison:    Records Reviewed: Nursing Notes, Old Medical Records, Previous electrocardiograms, Previous Radiology Studies and Previous Laboratory Studies    Procedures:  Procedures    Provider Notes (Medical Decision Making):   33GK F here with elevated BS, noted by cardiologist on labs done yesterday so instructed to come to ED. No sx coming to ed. VSS. Exam otherwise WNL. Labs reviewed-  BS initally 374, improved to 293 with NS. No signs of DKA. No insulin required  Cr and GFR c/w previous, chronic and stable  BNP c/w previous, h/o CHF. Not overloaded or needing diuresis at this time. CXR clear, lungs clear. Edema per daughter is chronic. H&H 7.2 and 23.2. Guac pos. No recent baseline to compare. Last H&H from 2016 and WNL. Pt unsure how long or if she has been bleeding from rectum. Urine c/w UTI, culture sent, will initate abx. Not septic.    11:49 PM  Consult: Discussed care with Dr. Brionna Powers (HOSPITALIST). Standard discussion; including history of patients chief complaint, available diagnostic results, and treatment course. Recommends GI consult and will admit if they will recommend inpatient procedure. 11:50 PM  Consult: Discussed care with Dr. David Ramírez (GI). Standard discussion; including history of patients chief complaint, available diagnostic results, and treatment course. Recommends rechecking Hgb, if stable, will f/u in office. If dropping, admit     2:10 AM  Repeat H&H down from initial. Hbg now 6.4. Hospitalist will admit. 1unit of blood ordered.   Eduar Pascual aware and agrees with plan. Spoke with hospitalist Godfrey Hand, with usual discussion of HPI, PE, and workup, he agrees to admit the patient to telemetry at this time. Admission orders place. Reviewed workup results, any meds, and admission plan with patient and any family present. Answered all questions. They agree to the plan for admission at this time. MED RECONCILIATION:  Current Facility-Administered Medications   Medication Dose Route Frequency    sodium chloride 0.9 % bolus infusion 250 mL  250 mL IntraVENous ONCE    cefTRIAXone (ROCEPHIN) injection 1 g  1 g IntraVENous NOW     Current Outpatient Medications   Medication Sig    furosemide (LASIX) 40 mg tablet Take 80mg in the morning and 80mg in the evening    spironolactone (ALDACTONE) 25 mg tablet Take one tablet by mouth daily    metoprolol tartrate (LOPRESSOR) 50 mg tablet Take 1 Tab by mouth two (2) times a day.  potassium chloride SR (KLOR-CON 10) 10 mEq tablet Take 1 Tab by mouth daily.  TRADJENTA 5 mg tablet Take 5 mg by mouth two (2) times a day.  aspirin 81 mg chewable tablet Take one tab daily-START 11/8/13    conjugated estrogens (PREMARIN) 0.3 mg tablet Take 0.3 mg by mouth daily.  folic acid (FOLVITE) 1 mg tablet Take 1 mg by mouth daily. Disposition:  admit        Follow-up Information    None         Current Discharge Medication List            Core Measures:    Critical Care Time:   Critical Care Time:   I have spent 30 minutes of critical care time involved in lab review, consultations with specialist, family decision-making, and documentation. During this entire length of time I was immediately available to the patient.     Critical Care:   The reason for providing this level of medical care for this critically ill patient was due a critical illness that impaired one or more vital organ systems such that there was a high probability of imminent or life threatening deterioration in the patients condition. This care involved high complexity decision making to assess, manipulate, and support vital system functions, to treat this degreee vital organ system failure and to prevent further life threatening deterioration of the patients condition. For Hospitalized Patients:    1. Hospitalization Decision Time:  The decision to hospitalize the patient was made by Dr. Lakesah Bear at [facility] on 3/8/2019    2. Aspirin: Aspirin was not given because the patient is a bleeding risk    Diagnosis     Clinical Impression:   1. Anemia, unspecified type    2. Type 2 diabetes with nephropathy ([address]    3. Chronic atrial fibrillation ([address]    4.  Rectal bleeding

## 2019-03-09 NOTE — PROGRESS NOTES
Primary Nurse Shanita Dowell RN and Wil Kirkland RN performed a dual skin assessment on this patient No impairment noted  Christopher score is 20

## 2019-03-09 NOTE — PROGRESS NOTES
Bedside and Verbal shift change report received from  Queen Jb RN (off going nurse). Report included the following information SBAR, Kardex, MAR and Recent Results. Assumed care patient in bed awake laying comfortably w/ sister at bedside. Fall prevention program maintained,call bell and bedside table within reach. No diet order was received,kept NPO until MD gives advice. No problems identified at this time,no complaints made by patient ,will continue care.      5592- Handoff report given to Kaur RN (oncoming nurse) by Odilia Rush RN (offgoing nurse). Report included the following information SBAR, Kardex, MAR and Recent Results. Morning medications given.

## 2019-03-09 NOTE — PROGRESS NOTES
Admit Date: 3/8/2019  Date of Service: 3/9/2019    Reason for follow-up: GI bleed      Assessment:         Acute Gi bleed:  Suspect upper; GI following  Acute anemia due to blood loss:  S/p I unit PRBC; Hb currently stable at 8.1  Possible UTI:  + UA  GERALDO: m/l due to blood loss and hypovoluemia  DM type 2, insulin dependent: poorly controlled with HbAc1 9.2. Plan:   Continue SSI, lantus and tight glucose control   - will need diabetic teaching given glucose of 446 and HbA1c of 9.2  Clear liquid diet and NPO after allegra\dnight  Follow CBC, BMP  Send urine culture  Start ceftriaxone for possible UTI  Add protonix    Discussed with Dr. Shelia Tan- plans for EGD in am  Discussed with nursing      Current Antibtiocs:   None    Lines:   peripheral      Case discussed with:  [x]Patient  [x]Family  [x]Nursing  []Case Management  DVT Prophylaxis:  []Lovenox  []Hep SQ  [x]SCDs  []Coumadin   []On Heparin gtt  I have independently examined the patient and reviewed all lab studies and imgaing as well as review of nursing notes and physican notes from the past 24 hours. The plan of care has been discussed with the patient and all questions are answered. Юлия Hernandez D.O. Pager [phone]      No Known Allergies        Subjective:      Pt seen and examined. Family at bedside. No current pain. No additional BM this far this am. Family was very concerned about her not eating. No GERD symptoms. No other concerns today. Objective:        Visit Vitals  /62 (BP 1 Location: Left arm, BP Patient Position: At rest)   Pulse 60   Temp 97.5 °F (36.4 °C)   Resp 17   Ht 5' 1\" (1.549 m)   Wt 44.9 kg (99 lb)   SpO2 97%   Breastfeeding?  No   BMI 18.71 kg/m²     Temp (24hrs), Av.8 °F (36.6 °C), Min:97.4 °F (36.3 °C), Max:98.2 °F (36.8 °C)        General:   awake alert and oriented, non-toxic   Skin:   no rashes or skin lesions noted on limited exam, dry and warm; makeup tattooes on face   HEENT:  No scleral icterus or pallor; oral mucosa moist, lips moist   Lymph Nodes:   not assessed today   Lungs:   non, labored; bilaterally clear to aspiration- no crackles wheezes rales or rhonchi   Heart:  RRR, s1 and s2; no murmurs rubs or gallops; no edema, + pedal pulses   Abdomen:  soft, non-distended, active bowel sounds, non-tender   Genitourinary:  deferred   Extremities:   decreased muscle tone; no contractures, no joint effusions   Neurologic:  No gross focal motor or sensory abnormalities; CN 2-12 intact; Follows commands. Psychiatric:   appropriate and interactive.        Labs: Results:   Chemistry Recent Labs     03/09/19  0547 03/08/19 2000 03/07/19  1701   * 374* 580*    133* 129*   K 4.1 5.1 5.4    96* 95*   CO2 27 26 25   BUN 49* 53* 53*   CREA 1.38* 1.79* 1.92*   CA 8.4* 8.7 8.7   AGAP 9 11 9   BUCR 36* 30* 28*   AP 64 82  --    TP 6.3* 7.4  --    ALB 2.7* 3.2*  --    GLOB 3.6 4.2*  --    AGRAT 0.8 0.8  --       CBC w/Diff Recent Labs     03/09/19  0547 03/09/19  0027 03/08/19 2000   WBC 6.3 6.6 7.9   RBC 2.44* 1.94* 2.15*   HGB 8.1* 6.4* 7.2*   HCT 24.6* 20.7* 23.2*    219 255   GRANS 66 64 71   LYMPH 19* 22 18*   EOS 1 1 1        No results found for: SDES No results found for: CULT       Imaging:     None to review today

## 2019-03-09 NOTE — PROGRESS NOTES
Bedside shift change report given to [address] (oncoming nurse) by Steve Ritter RN (offgoing nurse). Report included the following information SBAR, Kardex, ED Summary, [facility] and Recent Results.

## 2019-03-09 NOTE — CONSULTS
[facility]   [email or web address]      Impression:   1.GI bleeding  Hx of a fib but NOT on anticoagulation  NIDDM      Plan:     1. Hydrate  Tx as needed   Empiric PPI Rx  EGD in am , R/O PUD, R/O gastric CA      Chief Complaint:   Anemia, GI bleeding    HPI:  Purnima Aj is a 80 y.o. female who is being seen on consult for the above. . She presented w/ Sxs of severe fatigue. Subsequent w/u revealed a glucose >500. She also had a Hgb of ~6. She notes early satiety, wt loss and anorexia but no abd pain. Stools have been \"dark\". No previous Hx of GI bleeding, PUD. Has never had CRCS . PMH:   Past Medical History:   Diagnosis Date    Anemia     Arrhythmia     Atrial Fibrillation     Atrial fibrillation [facility])     pt refused anticoagulation    CHF (congestive heart failure) (HCC)     Coronary artery disease     Diabetes ([facility]     Diabetes mellitus ([facility]     Echocardiogram abnormal 11/20/2015    EF 55%. No WMA. Indeterminate diastolic fx. RVSP 40-45 mmHg. Mod LAE. Mild-mod NOMI. Mild-mod MR. Mod TR.      Holter monitor, abnormal 07/05/2013    Abnormal 48-hr Holter study. Atrial fibrillation w/slow ventricular rate, avg HR 50 bpm.  Hundreds of pauses > 3.5 sec, longest 7.4 sec.  HTN (hypertension)     Hyperlipidemia     Lower extremity venous duplex 12/16/2014    No DVT bilaterally. Bilateral pulsatile flow c/w elevated central venous pressure.     Paroxysmal atrial fibrillation (HCC)     Pulmonary HTN ([facility]     S/P cardiac pacemaker procedure 10-31-13    Implantation of MEDTRONIC single-chamber permanent pacemaker    Syncope 2/28/2011       PSH:   Past Surgical History:   Procedure Laterality Date    HX HYSTERECTOMY      HX PACEMAKER         Social HX:   Social History     Socioeconomic History    Marital status:      Spouse name: Not on file    Number of children: Not on file    Years of education: Not on file   Jean-Paul Maciel Highest education level: Not on file   Social Needs    Financial resource strain: Not on file    Food insecurity - worry: Not on file    Food insecurity - inability: Not on file    Transportation needs - medical: Not on file   [employer] needs - non-medical: Not on file   Occupational History    Not on file   Tobacco Use    Smoking status: Never Smoker    Smokeless tobacco: Never Used   Substance and Sexual Activity    Alcohol use: No    Drug use: Not on file    Sexual activity: Not on file   Other Topics Concern    Not on file   Social History Narrative    Not on file       FHX:   Family History   Problem Relation Age of Onset    Heart Attack Father        Allergy:   No Known Allergies    Home Medications:     Medications Prior to Admission   Medication Sig    glipiZIDE SR (GLUCOTROL XL) 2.5 mg CR tablet Take 2.5 mg by mouth daily.  furosemide (LASIX) 40 mg tablet Take 80mg in the morning and 80mg in the evening (Patient taking differently: Take 40 mg by mouth two (2) times a day. Take 80mg in the morning and 80mg in the evening)    spironolactone (ALDACTONE) 25 mg tablet Take one tablet by mouth daily    metoprolol tartrate (LOPRESSOR) 50 mg tablet Take 1 Tab by mouth two (2) times a day.  potassium chloride SR (KLOR-CON 10) 10 mEq tablet Take 1 Tab by mouth daily.  TRADJENTA 5 mg tablet Take 5 mg by mouth two (2) times a day.  aspirin 81 mg chewable tablet Take one tab daily-START 11/8/13    conjugated estrogens (PREMARIN) 0.3 mg tablet Take 0.3 mg by mouth daily.  folic acid (FOLVITE) 1 mg tablet Take 1 mg by mouth daily. Review of Systems:     Constitutional: Weak, anorexia, early satiety    Skin: No rashes, pruritis, jaundice, ulcerations, erythema. HENT: No headaches, nosebleeds, sinus pressure, rhinorrhea, sore throat. Eyes: No visual changes, blurred vision, eye pain, photophobia, jaundice. Cardiovascular: No chest pain, heart palpitations.    Respiratory: No cough, SOB, wheezing, chest discomfort, orthopnea. Gastrointestinal: Dark stool    Genitourinary: No dysuria, bleeding, discharge, pyuria. Musculoskeletal: No weakness, arthralgias, wasting. Endo: No sweats. Heme: No bruising, easy bleeding. Allergies: As noted. Neurological: Cranial nerves intact. Alert and oriented. Gait not assessed. Psychiatric:  No anxiety, depression, hallucinations. Visit Vitals  /62 (BP 1 Location: Left arm, BP Patient Position: At rest)   Pulse 60   Temp 97.5 °F (36.4 °C)   Resp 17   Ht 5' 1\" (1.549 m)   Wt 44.9 kg (99 lb)   SpO2 97%   Breastfeeding? No   BMI 18.71 kg/m²       Physical Assessment:     constitutional: appearance: well developed, well nourished, normal habitus, no deformities, in no acute distress. skin: inspection: no rashes, ulcers, icterus or other lesions; no clubbing or telangiectasias. palpation: no induration or subcutaneos nodules. eyes: inspection:  Black discoloration of upper lids  ENMT: mouth: normal oral mucosa,lips and gums; good dentition. oropharynx: normal tongue, hard and soft palate; posterior pharynx without erithema, exudate or lesions. neck: thyroid: normal size, consistency and position; no masses or tenderness. respiratory: effort: normal chest excursion; no intercostal retraction or accessory muscle use. cardiovascular: abdominal aorta: normal size and position; no bruits. palpation: PMI of normal size and position; normal rhythm; no thrill or murmurs. abdominal: abdomen: normal consistency; no tenderness or masses. hernias: no hernias appreciated. liver: normal size and consistency. spleen: not palpable. rectal: hemoccult/guaiac: not performed. musculoskeletal: digits and nails: no clubbing, cyanosis, petechiae or other inflammatory conditions. gait: normal gait and station head and neck: normal range of motion; no pain, crepitation or contracture.  spine/ribs/pelvis: normal range of motion; no pain, deformity or contracture. lymphatic: axilae: not palpable. groin: not palpable. neck: within normal limits. other: not palpable. neurologic: cranial nerves: II-XII normal.   psychiatric: judgement/insight: within normal limits. memory: within normal limits for recent and remote events. mood and affect: no evidence of depression, anxiety or agitation. orientation: oriented to time, space and person. Basic Metabolic Profile   Recent Labs     03/09/19  0547      K 4.1      CO2 27   BUN 49*   *   CA 8.4*         CBC w/Diff    Recent Labs     03/09/19  0547   WBC 6.3   RBC 2.44*   HGB 8.1*   HCT 24.6*   .8*   MCH 33.2   MCHC 32.9   RDW 21.4*       Recent Labs     03/09/19  0547   GRANS 66   LYMPH 19*   EOS 1        Hepatic Function   Recent Labs     03/09/19  0547   ALB 2.7*   TP 6.3*   TBILI 0.4   SGOT 20   AP 64          Domingo Finn MD, M.D. [facility], [address] [place] [address]  [email or web address]

## 2019-03-10 ENCOUNTER — ANESTHESIA EVENT (OUTPATIENT)
Dept: ENDOSCOPY | Age: 84
DRG: 377 | End: 2019-03-10
Payer: MEDICARE

## 2019-03-10 ENCOUNTER — ANESTHESIA (OUTPATIENT)
Dept: ENDOSCOPY | Age: 84
DRG: 377 | End: 2019-03-10
Payer: MEDICARE

## 2019-03-10 LAB
ABO + RH BLD: NORMAL
ANION GAP SERPL CALC-SCNC: 8 MMOL/L (ref 3–18)
APTT PPP: 26.7 SEC (ref 23–36.4)
BLD PROD TYP BPU: NORMAL
BLOOD GROUP ANTIBODIES SERPL: NORMAL
BPU ID: NORMAL
BUN SERPL-MCNC: 35 MG/DL (ref 7–18)
BUN/CREAT SERPL: 34 (ref 12–20)
CALCIUM SERPL-MCNC: 8.3 MG/DL (ref 8.5–10.1)
CALLED TO:,BCALL1: NORMAL
CHLORIDE SERPL-SCNC: 107 MMOL/L (ref 100–108)
CO2 SERPL-SCNC: 26 MMOL/L (ref 21–32)
CREAT SERPL-MCNC: 1.04 MG/DL (ref 0.6–1.3)
CROSSMATCH RESULT,%XM: NORMAL
GLUCOSE BLD STRIP.AUTO-MCNC: 100 MG/DL (ref 70–110)
GLUCOSE BLD STRIP.AUTO-MCNC: 103 MG/DL (ref 70–110)
GLUCOSE BLD STRIP.AUTO-MCNC: 105 MG/DL (ref 70–110)
GLUCOSE BLD STRIP.AUTO-MCNC: 160 MG/DL (ref 70–110)
GLUCOSE BLD STRIP.AUTO-MCNC: 184 MG/DL (ref 70–110)
GLUCOSE BLD STRIP.AUTO-MCNC: 214 MG/DL (ref 70–110)
GLUCOSE BLD STRIP.AUTO-MCNC: 59 MG/DL (ref 70–110)
GLUCOSE BLD STRIP.AUTO-MCNC: 61 MG/DL (ref 70–110)
GLUCOSE SERPL-MCNC: 88 MG/DL (ref 74–99)
HCT VFR BLD AUTO: 24.3 % (ref 35–45)
HCT VFR BLD AUTO: 25 % (ref 35–45)
HCT VFR BLD AUTO: 27.4 % (ref 35–45)
HGB BLD-MCNC: 7.5 G/DL (ref 12–16)
HGB BLD-MCNC: 7.6 G/DL (ref 12–16)
HGB BLD-MCNC: 8.3 G/DL (ref 12–16)
INR PPP: 1.2 (ref 0.8–1.2)
POTASSIUM SERPL-SCNC: 3.9 MMOL/L (ref 3.5–5.5)
PROTHROMBIN TIME: 15 SEC (ref 11.5–15.2)
SODIUM SERPL-SCNC: 141 MMOL/L (ref 136–145)
SPECIMEN EXP DATE BLD: NORMAL
STATUS OF UNIT,%ST: NORMAL
UNIT DIVISION, %UDIV: 0

## 2019-03-10 PROCEDURE — 74011636637 HC RX REV CODE- 636/637: Performed by: NURSE ANESTHETIST, CERTIFIED REGISTERED

## 2019-03-10 PROCEDURE — 77030018846 HC SOL IRR STRL H20 ICUM -A: Performed by: INTERNAL MEDICINE

## 2019-03-10 PROCEDURE — 74011250636 HC RX REV CODE- 250/636

## 2019-03-10 PROCEDURE — 85018 HEMOGLOBIN: CPT

## 2019-03-10 PROCEDURE — 74011000250 HC RX REV CODE- 250: Performed by: ANESTHESIOLOGY

## 2019-03-10 PROCEDURE — 82962 GLUCOSE BLOOD TEST: CPT

## 2019-03-10 PROCEDURE — 0DJ08ZZ INSPECTION OF UPPER INTESTINAL TRACT, VIA NATURAL OR ARTIFICIAL OPENING ENDOSCOPIC: ICD-10-PCS | Performed by: UROLOGY

## 2019-03-10 PROCEDURE — 77030008565 HC TBNG SUC IRR ERBE -B: Performed by: INTERNAL MEDICINE

## 2019-03-10 PROCEDURE — 85610 PROTHROMBIN TIME: CPT

## 2019-03-10 PROCEDURE — 74011636637 HC RX REV CODE- 636/637: Performed by: HOSPITALIST

## 2019-03-10 PROCEDURE — 76060000031 HC ANESTHESIA FIRST 0.5 HR: Performed by: INTERNAL MEDICINE

## 2019-03-10 PROCEDURE — 85730 THROMBOPLASTIN TIME PARTIAL: CPT

## 2019-03-10 PROCEDURE — 74011250636 HC RX REV CODE- 250/636: Performed by: ANESTHESIOLOGY

## 2019-03-10 PROCEDURE — 74011250636 HC RX REV CODE- 250/636: Performed by: INTERNAL MEDICINE

## 2019-03-10 PROCEDURE — 65660000000 HC RM CCU STEPDOWN

## 2019-03-10 PROCEDURE — 36415 COLL VENOUS BLD VENIPUNCTURE: CPT

## 2019-03-10 PROCEDURE — 74011250637 HC RX REV CODE- 250/637: Performed by: HOSPITALIST

## 2019-03-10 PROCEDURE — 74011000250 HC RX REV CODE- 250: Performed by: INTERNAL MEDICINE

## 2019-03-10 PROCEDURE — 76040000019: Performed by: INTERNAL MEDICINE

## 2019-03-10 PROCEDURE — 80048 BASIC METABOLIC PNL TOTAL CA: CPT

## 2019-03-10 PROCEDURE — 74011000258 HC RX REV CODE- 258

## 2019-03-10 RX ORDER — ONDANSETRON 2 MG/ML
4 INJECTION INTRAMUSCULAR; INTRAVENOUS ONCE
Status: DISCONTINUED | OUTPATIENT
Start: 2019-03-10 | End: 2019-03-10 | Stop reason: HOSPADM

## 2019-03-10 RX ORDER — PROPOFOL 10 MG/ML
INJECTION, EMULSION INTRAVENOUS AS NEEDED
Status: DISCONTINUED | OUTPATIENT
Start: 2019-03-10 | End: 2019-03-10 | Stop reason: HOSPADM

## 2019-03-10 RX ORDER — LIDOCAINE HYDROCHLORIDE 20 MG/ML
INJECTION, SOLUTION EPIDURAL; INFILTRATION; INTRACAUDAL; PERINEURAL AS NEEDED
Status: DISCONTINUED | OUTPATIENT
Start: 2019-03-10 | End: 2019-03-10 | Stop reason: HOSPADM

## 2019-03-10 RX ORDER — SODIUM CHLORIDE 9 MG/ML
25 INJECTION, SOLUTION INTRAVENOUS CONTINUOUS
Status: DISCONTINUED | OUTPATIENT
Start: 2019-03-10 | End: 2019-03-10 | Stop reason: HOSPADM

## 2019-03-10 RX ORDER — FAMOTIDINE 10 MG/ML
INJECTION INTRAVENOUS
Status: DISPENSED
Start: 2019-03-10 | End: 2019-03-10

## 2019-03-10 RX ORDER — INSULIN LISPRO 100 [IU]/ML
INJECTION, SOLUTION INTRAVENOUS; SUBCUTANEOUS ONCE
Status: COMPLETED | OUTPATIENT
Start: 2019-03-10 | End: 2019-03-10

## 2019-03-10 RX ORDER — DEXTROSE 50 % IN WATER (D50W) INTRAVENOUS SYRINGE
25-50 AS NEEDED
Status: DISCONTINUED | OUTPATIENT
Start: 2019-03-10 | End: 2019-03-10 | Stop reason: HOSPADM

## 2019-03-10 RX ORDER — PANTOPRAZOLE SODIUM 40 MG/1
40 TABLET, DELAYED RELEASE ORAL
Status: DISCONTINUED | OUTPATIENT
Start: 2019-03-11 | End: 2019-03-12 | Stop reason: HOSPADM

## 2019-03-10 RX ORDER — MAGNESIUM SULFATE 100 %
4 CRYSTALS MISCELLANEOUS AS NEEDED
Status: DISCONTINUED | OUTPATIENT
Start: 2019-03-10 | End: 2019-03-10 | Stop reason: HOSPADM

## 2019-03-10 RX ADMIN — FOLIC ACID 1 MG: 1 TABLET ORAL at 09:51

## 2019-03-10 RX ADMIN — INSULIN GLARGINE 10 UNITS: 100 INJECTION, SOLUTION SUBCUTANEOUS at 09:50

## 2019-03-10 RX ADMIN — FAMOTIDINE 20 MG: 10 INJECTION INTRAVENOUS at 08:23

## 2019-03-10 RX ADMIN — METOPROLOL TARTRATE 50 MG: 50 TABLET ORAL at 08:06

## 2019-03-10 RX ADMIN — PROPOFOL 20 MG: 10 INJECTION, EMULSION INTRAVENOUS at 08:52

## 2019-03-10 RX ADMIN — LIDOCAINE HYDROCHLORIDE 50 MG: 20 INJECTION, SOLUTION EPIDURAL; INFILTRATION; INTRACAUDAL; PERINEURAL at 08:47

## 2019-03-10 RX ADMIN — PROPOFOL 30 MG: 10 INJECTION, EMULSION INTRAVENOUS at 08:51

## 2019-03-10 RX ADMIN — WATER 1 G: 1 INJECTION INTRAMUSCULAR; INTRAVENOUS; SUBCUTANEOUS at 12:50

## 2019-03-10 RX ADMIN — PROPOFOL 30 MG: 10 INJECTION, EMULSION INTRAVENOUS at 08:49

## 2019-03-10 RX ADMIN — INSULIN LISPRO 4 UNITS: 100 INJECTION, SOLUTION INTRAVENOUS; SUBCUTANEOUS at 12:49

## 2019-03-10 RX ADMIN — INSULIN LISPRO 3 UNITS: 100 INJECTION, SOLUTION INTRAVENOUS; SUBCUTANEOUS at 09:06

## 2019-03-10 RX ADMIN — METOPROLOL TARTRATE 50 MG: 50 TABLET ORAL at 17:04

## 2019-03-10 RX ADMIN — SODIUM CHLORIDE 25 ML/HR: 900 INJECTION, SOLUTION INTRAVENOUS at 08:28

## 2019-03-10 RX ADMIN — INSULIN LISPRO 4 UNITS: 100 INJECTION, SOLUTION INTRAVENOUS; SUBCUTANEOUS at 12:47

## 2019-03-10 RX ADMIN — PROPOFOL 20 MG: 10 INJECTION, EMULSION INTRAVENOUS at 08:53

## 2019-03-10 NOTE — PROGRESS NOTES
Admit Date: 3/8/2019  Date of Service: 3/10/2019    Reason for follow-up: GI bleed      Assessment:         Acute Gi bleed:  3/10 EGD without clear source of bleeding; GI following  Acute anemia due to blood loss:  S/p I unit PRBC; Hb currently 7.5 (loss of 0.6 gm in 24 hours)  Possible UTI:  + UA  GERALDO: m/l due to blood loss and hypovoluemia  DM type 2, insulin dependent: poorly controlled with HbAc1 9.2. Plan:   Continue SSI, lantus and tight glucose control   - will need diabetic teaching given glucose of 446 and HbA1c of 9.2  Clear liquid diet   Follow CBC, BMP  f/u urine culture  continue ceftriaxone for possible UTI  continue protonix    Discussed with Dr. Denver Kay  Discussed with nursing      Current Antibtiocs:   None    Lines:   peripheral      Case discussed with:  [x]Patient  [x]Family  [x]Nursing  []Case Management  DVT Prophylaxis:  []Lovenox  []Hep SQ  [x]SCDs  []Coumadin   []On Heparin gtt  I have independently examined the patient and reviewed all lab studies and imgaing as well as review of nursing notes and physican notes from the past 24 hours. The plan of care has been discussed with the patient and all questions are answered. Hue Smith D.O. Pager [phone]      No Known Allergies        Subjective:      Pt seen and examined. Family at bedside. No current pain. No additional BM this far this am.  No GERD symptoms. No other concerns today. Objective:        Visit Vitals  /60   Pulse 63   Temp 96.8 °F (36 °C)   Resp 14   Ht 5' 1\" (1.549 m)   Wt 45 kg (99 lb 4.8 oz)   SpO2 95%   Breastfeeding?  No   BMI 18.76 kg/m²     Temp (24hrs), Av.8 °F (36.6 °C), Min:96.8 °F (36 °C), Max:98.6 °F (37 °C)        General:   awake alert and oriented, non-toxic   Skin:   no rashes or skin lesions noted on limited exam, dry and warm; makeup tattooes on face   HEENT:  No scleral icterus or pallor; oral mucosa moist, lips moist   Lymph Nodes:   not assessed today   Lungs:   non, labored; bilaterally clear to aspiration- no crackles wheezes rales or rhonchi   Heart:  RRR, s1 and s2; no murmurs rubs or gallops; no edema, + pedal pulses   Abdomen:  soft, non-distended, active bowel sounds, non-tender   Genitourinary:  deferred   Extremities:   decreased muscle tone; no contractures, no joint effusions   Neurologic:  No gross focal motor or sensory abnormalities; CN 2-12 intact; Follows commands. Psychiatric:   appropriate and interactive. Labs: Results:   Chemistry Recent Labs     03/10/19  0312 03/09/19  0547 03/08/19 2000   GLU 88 187* 374*    138 133*   K 3.9 4.1 5.1    102 96*   CO2 26 27 26   BUN 35* 49* 53*   CREA 1.04 1.38* 1.79*   CA 8.3* 8.4* 8.7   AGAP 8 9 11   BUCR 34* 36* 30*   AP  --  64 82   TP  --  6.3* 7.4   ALB  --  2.7* 3.2*   GLOB  --  3.6 4.2*   AGRAT  --  0.8 0.8      CBC w/Diff Recent Labs     03/10/19  1225 03/10/19  0312 03/09/19  2115  03/09/19  0547 03/09/19  0027 03/08/19 2000   WBC  --   --   --   --  6.3 6.6 7.9   RBC  --   --   --   --  2.44* 1.94* 2.15*   HGB 7.6* 7.5* 7.8*   < > 8.1* 6.4* 7.2*   HCT 25.0* 24.3* 24.9*   < > 24.6* 20.7* 23.2*   PLT  --   --   --   --  200 219 255   GRANS  --   --   --   --  66 64 71   LYMPH  --   --   --   --  19* 22 18*   EOS  --   --   --   --  1 1 1    < > = values in this interval not displayed.         No results found for: [facility] Lab Results   Component Value Date/Time    Culture result: NO GROWTH AFTER 18 HOURS 03/09/2019 03:41 PM          Imaging:     None to review today

## 2019-03-10 NOTE — PERIOP NOTES
TRANSFER - OUT REPORT:    Verbal report given to Margarita Grajeda RN on Aurelia Moise  being transferred to  for routine post - op       Report consisted of patients Situation, Background, Assessment and   Recommendations(SBAR). Information from the following report(s) SBAR and MAR was reviewed with the receiving nurse. Lines:   Peripheral IV 03/08/19 Right Arm (Active)   Site Assessment Clean, dry, & intact 3/10/2019  8:59 AM   Phlebitis Assessment 0 3/10/2019  8:59 AM   Infiltration Assessment 0 3/10/2019  8:59 AM   Dressing Status Clean, dry, & intact 3/10/2019  8:59 AM   Dressing Type Tape;Transparent 3/10/2019  8:59 AM   Hub Color/Line Status Pink;Capped 3/10/2019  8:59 AM   Action Taken Other (comment) 3/9/2019 10:30 AM   Alcohol Cap Used Yes 3/10/2019  1:00 AM       Peripheral IV 03/09/19 Left Forearm (Active)   Site Assessment Clean, dry, & intact 3/10/2019  8:59 AM   Phlebitis Assessment 0 3/10/2019  8:59 AM   Infiltration Assessment 0 3/10/2019  8:59 AM   Dressing Status Clean, dry, & intact 3/10/2019  8:59 AM   Dressing Type Tape;Transparent 3/10/2019  8:59 AM   Hub Color/Line Status Blue; Infusing 3/10/2019  8:59 AM   Action Taken Other (comment) 3/9/2019 10:30 AM   Alcohol Cap Used Yes 3/10/2019  1:00 AM        Opportunity for questions and clarification was provided.       Patient transported with:   Registered Nurse

## 2019-03-10 NOTE — PROGRESS NOTES
Informed by CNA pt blood sugar 59. Given 4oz apple juice. Pt is also taking sweet tea and beef broth for dinner. Will recheck blood sugar at 1700.    1700 rechecked pt .

## 2019-03-10 NOTE — PROGRESS NOTES
Bedside and Verbal shift change report given to [address] (oncoming nurse) by Manuel Cerda RN   (offgoing nurse). Report given with SBAR, Kardex, MAR and Recent Results.

## 2019-03-10 NOTE — ANESTHESIA POSTPROCEDURE EVALUATION
Procedure(s):  ENDOSCOPY. Anesthesia Post Evaluation      Multimodal analgesia: multimodal analgesia used between 6 hours prior to anesthesia start to PACU discharge  Patient location during evaluation: bedside  Patient participation: complete - patient participated  Level of consciousness: awake  Pain management: adequate  Airway patency: patent  Anesthetic complications: no  Cardiovascular status: acceptable  Respiratory status: acceptable  Hydration status: acceptable  Post anesthesia nausea and vomiting:  controlled      Visit Vitals  /60   Pulse 63   Temp 36 °C (96.8 °F)   Resp 14   Ht 5' 1\" (1.549 m)   Wt 45 kg (99 lb 4.8 oz)   SpO2 95%   Breastfeeding?  No   BMI 18.76 kg/m²

## 2019-03-10 NOTE — PROGRESS NOTES
Patient is not available to be assessed at this time. No family at bedside.     [facility]   Board Certified [address] [place] [address]   [phone]

## 2019-03-10 NOTE — PROCEDURES
[facility]  [facility], [address]     Endoscopic Gastroduodenoscopy Procedure Note    Balaji Parsons  11/26/1930  [id number]    Indication: GI bleeding     : Armida Doyle MD    Referring Provider:  Gabino Lopez MD    Anesthesia/Sedation:  MAC anesthesia Propofol      Procedure Details     After infomed consent was obtained for the procedure, with all risks and benefits of procedure explained the patient was taken to the endoscopy suite and placed in the left lateral decubitus position. Following sequential administration of sedation as per above, the endoscope was inserted into the mouth and advanced under direct vision to third portion of the duodenum. A careful inspection was made as the gastroscope was withdrawn, including a retroflexed view of the proximal stomach; findings and interventions are described below. Findings:   Esophagus: hiatal hernia, small  Stomach: normal   Duodenum/jejunum: normal    Therapies:  none    Specimens: * No specimens in log *           Complications:   None; patient tolerated the procedure well. EBL:  None. Impression:    Sm hitial hernia      Recommendations:  -Await JAQUELINE test result and treat for Helicobacter pylori if positive. , -No NSAIDS, -will d/w family possible further w/u she has never had CRCS  Source of anemia/GI bleeding unclear     Armida Doyle MD  3/10/2019  9:37 AM

## 2019-03-10 NOTE — ANESTHESIA PREPROCEDURE EVALUATION
Anesthetic History   No history of anesthetic complications            Review of Systems / Medical History      Pulmonary  Within defined limits                 Neuro/Psych   Within defined limits           Cardiovascular    Hypertension        Dysrhythmias   Pacemaker and CAD         GI/Hepatic/Renal               Comments: GI Bleed with transfusion Endo/Other    Diabetes: well controlled, type 2    Anemia     Other Findings   Comments: GI Bleed         Physical Exam    Airway  Mallampati: I           Cardiovascular    Rhythm: regular      Murmur: Grade 3     Dental    Dentition: Full upper dentures and Full lower dentures     Pulmonary  Breath sounds clear to auscultation               Abdominal  GI exam deferred       Other Findings            Anesthetic Plan    ASA: 3  Anesthesia type: MAC            Anesthetic plan and risks discussed with: Son / Daughter and Patient

## 2019-03-10 NOTE — ROUTINE PROCESS
TRANSFER - IN REPORT:    Verbal report received from Primary Nurse(name) on [facility] [place]  being received from  420(unit) for ordered procedure      Report consisted of patients Situation, Background, Assessment and   Recommendations(SBAR). Information from the following report(s) SBAR, MAR and Recent Results was reviewed with the receiving nurse. Opportunity for questions and clarification was provided. Assessment completed upon patients arrival to unit and care assumed.

## 2019-03-10 NOTE — PROGRESS NOTES
Problem: Falls - Risk of  Goal: *Absence of Falls  Document Darrin Fall Risk and appropriate interventions in the flowsheet. Fall Risk Interventions:  Mobility Interventions: Patient to call before getting OOB         Medication Interventions: Patient to call before getting OOB    Elimination Interventions: Bed/chair exit alarm, Patient to call for help with toileting needs, Toileting schedule/hourly rounds             Problem: Pressure Injury - Risk of  Goal: *Prevention of pressure injury  Document Christopher Scale and appropriate interventions in the flowsheet.   Outcome: Progressing Towards Goal  Pressure Injury Interventions:       Moisture Interventions: Absorbent underpads, Check for incontinence Q2 hours and as needed, Minimize layers    Activity Interventions: Pressure redistribution bed/mattress(bed type)    Mobility Interventions: HOB 30 degrees or less    Nutrition Interventions: Document food/fluid/supplement intake    Friction and Shear Interventions: Minimize layers, HOB 30 degrees or less

## 2019-03-10 NOTE — PROGRESS NOTES
Bedside shift change report given to Marella Dakins RN (oncoming nurse) by Viridiana Valentine RN (offgoing nurse).  Report included the following information SBAR, Kardex, ED Summary, MAR, Recent Results and Pre Procedure Checklist.

## 2019-03-11 LAB
BACTERIA SPEC CULT: NORMAL
GLUCOSE BLD STRIP.AUTO-MCNC: 103 MG/DL (ref 70–110)
GLUCOSE BLD STRIP.AUTO-MCNC: 174 MG/DL (ref 70–110)
GLUCOSE BLD STRIP.AUTO-MCNC: 242 MG/DL (ref 70–110)
GLUCOSE BLD STRIP.AUTO-MCNC: 270 MG/DL (ref 70–110)
HCT VFR BLD AUTO: 25.9 % (ref 35–45)
HCT VFR BLD AUTO: 26.9 % (ref 35–45)
HCT VFR BLD AUTO: 27.7 % (ref 35–45)
HGB BLD-MCNC: 7.8 G/DL (ref 12–16)
HGB BLD-MCNC: 8.2 G/DL (ref 12–16)
HGB BLD-MCNC: 8.4 G/DL (ref 12–16)
SERVICE CMNT-IMP: NORMAL

## 2019-03-11 PROCEDURE — 74011250637 HC RX REV CODE- 250/637: Performed by: HOSPITALIST

## 2019-03-11 PROCEDURE — 74011636637 HC RX REV CODE- 636/637: Performed by: HOSPITALIST

## 2019-03-11 PROCEDURE — 65660000000 HC RM CCU STEPDOWN

## 2019-03-11 PROCEDURE — 82962 GLUCOSE BLOOD TEST: CPT

## 2019-03-11 PROCEDURE — 74011250637 HC RX REV CODE- 250/637: Performed by: INTERNAL MEDICINE

## 2019-03-11 PROCEDURE — 36415 COLL VENOUS BLD VENIPUNCTURE: CPT

## 2019-03-11 PROCEDURE — 85018 HEMOGLOBIN: CPT

## 2019-03-11 PROCEDURE — 74011636637 HC RX REV CODE- 636/637: Performed by: INTERNAL MEDICINE

## 2019-03-11 RX ORDER — DEXTROSE 50 % IN WATER (D50W) INTRAVENOUS SYRINGE
25-50 AS NEEDED
Status: DISCONTINUED | OUTPATIENT
Start: 2019-03-11 | End: 2019-03-12 | Stop reason: HOSPADM

## 2019-03-11 RX ORDER — INSULIN LISPRO 100 [IU]/ML
INJECTION, SOLUTION INTRAVENOUS; SUBCUTANEOUS
Status: DISCONTINUED | OUTPATIENT
Start: 2019-03-11 | End: 2019-03-12 | Stop reason: HOSPADM

## 2019-03-11 RX ORDER — MAGNESIUM SULFATE 100 %
4 CRYSTALS MISCELLANEOUS AS NEEDED
Status: DISCONTINUED | OUTPATIENT
Start: 2019-03-11 | End: 2019-03-12 | Stop reason: HOSPADM

## 2019-03-11 RX ORDER — LOPERAMIDE HYDROCHLORIDE 2 MG/1
2 CAPSULE ORAL ONCE
Status: COMPLETED | OUTPATIENT
Start: 2019-03-11 | End: 2019-03-11

## 2019-03-11 RX ADMIN — METOPROLOL TARTRATE 50 MG: 50 TABLET ORAL at 18:37

## 2019-03-11 RX ADMIN — INSULIN LISPRO 4 UNITS: 100 INJECTION, SOLUTION INTRAVENOUS; SUBCUTANEOUS at 13:58

## 2019-03-11 RX ADMIN — PANTOPRAZOLE SODIUM 40 MG: 40 TABLET, DELAYED RELEASE ORAL at 09:53

## 2019-03-11 RX ADMIN — INSULIN LISPRO 4 UNITS: 100 INJECTION, SOLUTION INTRAVENOUS; SUBCUTANEOUS at 13:57

## 2019-03-11 RX ADMIN — LOPERAMIDE HYDROCHLORIDE 2 MG: 2 CAPSULE ORAL at 04:08

## 2019-03-11 RX ADMIN — INSULIN LISPRO 6 UNITS: 100 INJECTION, SOLUTION INTRAVENOUS; SUBCUTANEOUS at 22:43

## 2019-03-11 RX ADMIN — FOLIC ACID 1 MG: 1 TABLET ORAL at 09:53

## 2019-03-11 RX ADMIN — INSULIN LISPRO 2 UNITS: 100 INJECTION, SOLUTION INTRAVENOUS; SUBCUTANEOUS at 18:37

## 2019-03-11 RX ADMIN — INSULIN GLARGINE 10 UNITS: 100 INJECTION, SOLUTION SUBCUTANEOUS at 09:53

## 2019-03-11 RX ADMIN — METOPROLOL TARTRATE 50 MG: 50 TABLET ORAL at 09:52

## 2019-03-11 NOTE — ROUTINE PROCESS
Bedside and Verbal shift change report given to [address] (oncoming nurse) by Linda Abbott RN (offgoing nurse). Report included the following information SBAR, Kardex, Intake/Output and Recent Results.

## 2019-03-11 NOTE — PROGRESS NOTES
Admit Date: 3/8/2019  Date of Service: 3/11/2019    Reason for follow-up: GI bleed      Assessment:         Acute Gi bleed:  3/10 EGD without clear source of bleeding; no inpatient colonoscopy planned  Acute anemia due to blood loss:  S/p I unit PRBC; Hb currently 7.5 (loss of 0.6 gm in 24 hours); s/p EGD without source for bleeding  Possible UTI:  + UA; cultures without growth  GERALDO: m/l due to blood loss and hypovoluemia  DM type 2, insulin dependent: poorly controlled with HbAc1 9.2. Plan:   Continue SSI, lantus and tight glucose control   - will need diabetic teaching given glucose of 446 and HbA1c of 9.2  Advance diet as tolerated  Follow CBC, BMP  f/u urine culture  completed course of ceftriaxone for possible UTI  continue protonix    Discussed with Dr. Harrington Standing  Discussed with nursing    Hopefully d/c in am with f/u with GI for possible colonoscopy. Current Antibtiocs:   None    Lines:   peripheral      Case discussed with:  [x]Patient  [x]Family  [x]Nursing  []Case Management  DVT Prophylaxis:  []Lovenox  []Hep SQ  [x]SCDs  []Coumadin   []On Heparin gtt  I have independently examined the patient and reviewed all lab studies and imgaing as well as review of nursing notes and physican notes from the past 24 hours. The plan of care has been discussed with the patient and all questions are answered. Alessandro Briones D.O. Pager [phone]      No Known Allergies        Subjective:      Pt seen and examined. Family at bedside. No current pain. + diarrhea x 2. Had some pain after eating tomato soup last night. No GERD symptoms. No other concerns today. Objective:        Visit Vitals  /63 (BP 1 Location: Right arm, BP Patient Position: Sitting)   Pulse 67   Temp 97.8 °F (36.6 °C)   Resp 17   Ht 5' 1\" (1.549 m)   Wt 44.9 kg (99 lb)   SpO2 95%   Breastfeeding?  No   BMI 18.71 kg/m²     Temp (24hrs), Av °F (36.7 °C), Min:97.4 °F (36.3 °C), Max:98.4 °F (36.9 °C)        General:   awake alert and oriented, non-toxic   Skin:   no rashes or skin lesions noted on limited exam, dry and warm; makeup tattooes on face   HEENT:  No scleral icterus or pallor; oral mucosa moist, lips moist   Lymph Nodes:   not assessed today   Lungs:   non, labored; bilaterally clear to aspiration- no crackles wheezes rales or rhonchi   Heart:  RRR, s1 and s2; no murmurs rubs or gallops; no edema, + pedal pulses   Abdomen:  soft, non-distended, active bowel sounds, non-tender   Genitourinary:  deferred   Extremities:   decreased muscle tone; no contractures, no joint effusions   Neurologic:  No gross focal motor or sensory abnormalities; CN 2-12 intact; Follows commands. Psychiatric:   appropriate and interactive. Labs: Results:   Chemistry Recent Labs     03/10/19  0312 03/09/19  0547 03/08/19 2000   GLU 88 187* 374*    138 133*   K 3.9 4.1 5.1    102 96*   CO2 26 27 26   BUN 35* 49* 53*   CREA 1.04 1.38* 1.79*   CA 8.3* 8.4* 8.7   AGAP 8 9 11   BUCR 34* 36* 30*   AP  --  64 82   TP  --  6.3* 7.4   ALB  --  2.7* 3.2*   GLOB  --  3.6 4.2*   AGRAT  --  0.8 0.8      CBC w/Diff Recent Labs     03/11/19  1138 03/11/19  0347 03/10/19  1855  03/09/19  0547 03/09/19  0027 03/08/19 2000   WBC  --   --   --   --  6.3 6.6 7.9   RBC  --   --   --   --  2.44* 1.94* 2.15*   HGB 8.2* 8.4* 8.3*   < > 8.1* 6.4* 7.2*   HCT 26.9* 27.7* 27.4*   < > 24.6* 20.7* 23.2*   PLT  --   --   --   --  200 219 255   GRANS  --   --   --   --  66 64 71   LYMPH  --   --   --   --  19* 22 18*   EOS  --   --   --   --  1 1 1    < > = values in this interval not displayed.         No results found for: SDES Lab Results   Component Value Date/Time    Culture result: NO GROWTH 2 DAYS 03/09/2019 03:41 PM          Imaging:     None to review today

## 2019-03-11 NOTE — PROGRESS NOTES
NUTRITION    Nutrition Screen      RECOMMENDATIONS / PLAN:     - Continue supplements  - Update food preferences  - Recommend advancing diet as tolerated. - Continue RD inpatient monitoring and evaluation. NUTRITION INTERVENTIONS & DIAGNOSIS:     [x] Meals/snacks: modify composition, advance as tolerated   [x] Medical food supplement therapy: Gelatein BID    Nutrition Diagnosis: Inadequate oral intake related to decreased appetite, early satiety and fatigue as evidenced by pt skipping meals or consuming 50% or less of 1-2 meals per day for the past several months PTA. ASSESSMENT:     3/11: Pt advanced to full liquids on 3/10; reported tolerating meals. Food preferences. Likes/consuming Gelatein supplements. Appetite improving    3/9: Admitted with hyperglycemia and anemia, started on clear liquids. Denies nausea/vomiting and reports poor appetite and has not been eating well for the past several months. Dislikes Glucerna Shake supplements- prefers clear liquid supplement and agreeable to [facility].      Average po intake adequate to meet patients estimated nutritional needs:   [] Yes     [x] No   [] Unable to determine at this time    Diet: DIET NUTRITIONAL SUPPLEMENTS Dinner, Breakfast; GELATEIN 20  DIET FULL LIQUID      Food Allergies: NKFA  Current Appetite:   [x] Good     [x] Fair     [] Poor     [] Other:  Appetite/meal intake prior to admission:   [] Good     [] Fair     [x] Poor     [x] Other: early satiety, decreased appetite and poor meal intake for the past several months PTA  Feeding Limitations:  [] Swallowing difficulty    [] Chewing difficulty    [] Other:  Current Meal Intake:   Patient Vitals for the past 100 hrs:   % Diet Eaten   03/10/19 1114 0 %       BM: 3/11  Skin Integrity: WDL  Edema:   [] No     [x] Yes   Pertinent Medications: Reviewed: SSI, lantus, folic acid, protonix    Recent Labs     03/10/19  0312 03/09/19  0547 03/08/19 2000    138 133*   K 3.9 4.1 5.1    102 96* CO2 26 27 26   GLU 88 187* 374*   BUN 35* 49* 53*   CREA 1.04 1.38* 1.79*   CA 8.3* 8.4* 8.7   ALB  --  2.7* 3.2*   SGOT  --  20 31   ALT  --  15 19       Intake/Output Summary (Last 24 hours) at 3/11/2019 1221  Last data filed at 3/10/2019 1644  Gross per 24 hour   Intake 240 ml   Output    Net 240 ml       Anthropometrics:  Ht Readings from Last 1 Encounters:   03/09/19 5' 1\" (1.549 m)     Last 3 Recorded Weights in this Encounter    03/09/19 0337 03/10/19 0744 03/11/19 0427   Weight: 44.9 kg (99 lb) 45 kg (99 lb 4.8 oz) 44.9 kg (99 lb)     Body mass index is 18.71 kg/m². Underweight      Weight History: patient reports usual weight of 94 lb and weight gain PTA due to fluid accumulation     Weight Metrics 3/11/2019 3/8/2019 3/7/2019 10/10/2018 8/14/2018 2/16/2018 4/21/2017   Weight 99 lb - 104 lb 89 lb 89 lb 101 lb 100 lb   BMI - 18.71 kg/m2 23.32 kg/m2 19.95 kg/m2 18.6 kg/m2 21.11 kg/m2 20.9 kg/m2        Admitting Diagnosis: Anemia [D64.9]  GI (gastrointestinal bleed) [K92.2]  Pertinent PMHx: DM, atrial fibrillation, CAD, HTN, HLD    Education Needs:        [x] None identified  [] Identified - Not appropriate at this time  []  Identified and addressed - refer to education log  Learning Limitations:   [x] None identified  [] Identified    Cultural, Scientology & ethnic food preferences:  [x] None identified    [] Identified and addressed     ESTIMATED NUTRITION NEEDS:     Additional 500 kcal per day to promote weight gain   Calories: 5258-3954 kcal (MSJx1.2-1.3) based on  [x] Actual BW 45 kg     [] IBW   Protein: 36-45 gm (0.8-1 gm/kg) based on  [x] Actual BW      [] IBW   Fluid: 1 mL/kcal     MONITORING & EVALUATION:     Nutrition Goal(s):   1. Po intake of meals will meet >75% of patient estimated nutritional needs within the next 7 days. Outcome:  [] Met/Ongoing    [x]  Not Met/Progressin    [] New/Initial Goal   2. Weight gain of 1-2 lbs over the next 7 days.    Outcome:  [] Met/Ongoing    [x]  Not Met    [] New/Initial Goal      Monitoring:   [x] Food and beverage intake   [x] Diet order   [x] Nutrition-focused physical findings   [x] Treatment/therapy   [] Weight   [] Enteral nutrition intake        Previous Recommendations (for follow-up assessments only):     [x]   Implemented       []   Not Implemented (RD to address)      [] No Longer Appropriate     [] No Recommendation Made     Discharge Planning: cardiac, diabetic diet   [x] Participated in care planning, discharge planning, & interdisciplinary rounds as appropriate      Dilip Zavala, [address] [place] [address]    Pager: [phone]

## 2019-03-11 NOTE — ROUTINE PROCESS
Bedside shift change report given to Kevyn Myles RN (oncoming nurse) by Luis Angel Danielle (offgoing nurse). Report included the following information SBAR, Kardex, Intake/Output, MAR, Recent Results and Quality Measures.

## 2019-03-11 NOTE — ROUTINE PROCESS
Bedside and Verbal shift change report given to [address] (oncoming nurse) by Serjio Riley RN (offgoing nurse). Report included the following information SBAR, Kardex, Intake/Output and Recent Results.

## 2019-03-11 NOTE — PROGRESS NOTES
[facility]  [phone]    Gastroenterology follow up-Progress note    Impression:  1. GI hemorrhage - EGD 3/10/2019 without clear active upper GI bleeding  2. Hiatal hernia  3. Anemia - Hbg/Hct stable today. 1 unit PRBC 3/9/2019  4. Hypoalbuminemia        Plan:  1. Continue PPI; No NSAIDs  2. Monitor Hgb/Hct and transfuse per policy  3. Consider CTA/bleeding scan if active/significant GI hemorrhage  4. Consider H. pylori stool antigen test 4-6 weeks off PPI  5. Medical management per primary team  6. Follow-up outpatient with [facility] after discharge. Will sign off-Thank you for this consultation and the opportunity to participate in the care of this patient. Please do not hesitate to call with any questions or concerns, or should event occur that may necessitate additional GI evaluation. Subjective:  Reports doing well. Patient in a cheerful mood. Tolerating diet. Had doose stools without visible blood/rectal bleeding. No abdominal pain, fever, chills. ROS: Denies any rash. Eyes: conjunctiva normal, EOM normal   Neck: ROM normal, supple and trachea normal   Cardiovascular: normal rate and regular rhythm   Pulmonary/Chest Wall: breath sounds normal and effort normal   Abdominal: appearance normal, bowel sounds normal and soft, non-acute, non-tender     Patient Active Problem List   Diagnosis Code    Atrial fibrillation ([facility]) I48.91    Hyperlipidemia E78.5    Diabetes mellitus ([facility] [address] E11.9    HTN (hypertension) I10    SSS (sick sinus syndrome) ([facility]) I49.5    Cardiac pacemaker in situ Z95.0    Pulmonary HTN ([facility]) I27.20    CHF (congestive heart failure) (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  No   BMI 18.71 kg/m²           Intake/Output Summary (Last 24 hours) at 3/11/2019 1033  Last data filed at 3/10/2019 1644  Gross per 24 hour   Intake 240 ml   Output    Net 240 ml       CBC w/Diff    Lab Results   Component Value Date/Time    WBC 6.3 03/09/2019 05:47 AM    RBC 2.44 (L) 03/09/2019 05:47 AM    HGB 8.4 (L) 03/11/2019 03:47 AM    HCT 27.7 (L) 03/11/2019 03:47 AM    .8 (H) 03/09/2019 05:47 AM    MCH 33.2 03/09/2019 05:47 AM    MCHC 32.9 03/09/2019 05:47 AM    RDW 21.4 (H) 03/09/2019 05:47 AM     03/09/2019 05:47 AM    Lab Results   Component Value Date/Time    GRANS 66 03/09/2019 05:47 AM    LYMPH 19 (L) 03/09/2019 05:47 AM    EOS 1 03/09/2019 05:47 AM    BASOS 1 03/09/2019 05:47 AM      Basic Metabolic Profile   Recent Labs     03/10/19  0312      K 3.9      CO2 26   BUN 35*   CA 8.3*        Hepatic Function    Lab Results   Component Value Date/Time    ALB 2.7 (L) 03/09/2019 05:47 AM    TP 6.3 (L) 03/09/2019 05:47 AM    AP 64 03/09/2019 05:47 AM    Lab Results   Component Value Date/Time    SGOT 20 03/09/2019 05:47 AM          Coags   Recent Labs     03/10/19  0312   PTP 15.0   INR 1.2   APTT 26.7               BONIFACIO Quevedo    [facility]. [employer]davian  Phone: [phone]  Pager: [phone]

## 2019-03-11 NOTE — PROGRESS NOTES
Daughter states that patient has had two episodes of loose stool this evening and is requesting we give her something to help. MD paged, orders for one time dose Imodium received. Will continue to monitor.

## 2019-03-11 NOTE — PROGRESS NOTES
Problem: Pressure Injury - Risk of  Goal: *Prevention of pressure injury  Document Christopher Scale and appropriate interventions in the flowsheet.   Outcome: Progressing Towards Goal  Pressure Injury Interventions:  Sensory Interventions: Keep linens dry and wrinkle-free, Maintain/enhance activity level, Minimize linen layers    Moisture Interventions: Absorbent underpads, Minimize layers    Activity Interventions: PT/OT evaluation, Increase time out of bed    Mobility Interventions: PT/OT evaluation, HOB 30 degrees or less    Nutrition Interventions: Document food/fluid/supplement intake, Offer support with meals,snacks and hydration    Friction and Shear Interventions: Foam dressings/transparent film/skin sealants, HOB 30 degrees or less, Minimize layers

## 2019-03-12 VITALS
RESPIRATION RATE: 16 BRPM | DIASTOLIC BLOOD PRESSURE: 57 MMHG | WEIGHT: 98.57 LBS | TEMPERATURE: 98.4 F | HEART RATE: 60 BPM | HEIGHT: 61 IN | SYSTOLIC BLOOD PRESSURE: 142 MMHG | OXYGEN SATURATION: 96 % | BODY MASS INDEX: 18.61 KG/M2

## 2019-03-12 LAB
GLUCOSE BLD STRIP.AUTO-MCNC: 101 MG/DL (ref 70–110)
GLUCOSE BLD STRIP.AUTO-MCNC: 171 MG/DL (ref 70–110)
HCT VFR BLD AUTO: 24.9 % (ref 35–45)
HGB BLD-MCNC: 7.6 G/DL (ref 12–16)

## 2019-03-12 PROCEDURE — 74011636637 HC RX REV CODE- 636/637: Performed by: INTERNAL MEDICINE

## 2019-03-12 PROCEDURE — 74011250637 HC RX REV CODE- 250/637: Performed by: INTERNAL MEDICINE

## 2019-03-12 PROCEDURE — 85018 HEMOGLOBIN: CPT

## 2019-03-12 PROCEDURE — 36415 COLL VENOUS BLD VENIPUNCTURE: CPT

## 2019-03-12 PROCEDURE — 74011250637 HC RX REV CODE- 250/637: Performed by: HOSPITALIST

## 2019-03-12 PROCEDURE — 74011636637 HC RX REV CODE- 636/637: Performed by: HOSPITALIST

## 2019-03-12 PROCEDURE — 82962 GLUCOSE BLOOD TEST: CPT

## 2019-03-12 RX ORDER — PANTOPRAZOLE SODIUM 40 MG/1
40 TABLET, DELAYED RELEASE ORAL
Qty: 30 TAB | Refills: 0 | Status: SHIPPED | OUTPATIENT
Start: 2019-03-13

## 2019-03-12 RX ORDER — FUROSEMIDE 40 MG/1
TABLET ORAL
Qty: 120 TAB | Refills: 6 | Status: SHIPPED | OUTPATIENT
Start: 2019-03-12 | End: 2020-03-18

## 2019-03-12 RX ORDER — GLIPIZIDE 5 MG/1
5 TABLET, FILM COATED, EXTENDED RELEASE ORAL DAILY
Qty: 30 TAB | Refills: 0 | Status: SHIPPED | OUTPATIENT
Start: 2019-03-12

## 2019-03-12 RX ADMIN — INSULIN LISPRO 2 UNITS: 100 INJECTION, SOLUTION INTRAVENOUS; SUBCUTANEOUS at 11:44

## 2019-03-12 RX ADMIN — METOPROLOL TARTRATE 50 MG: 50 TABLET ORAL at 10:25

## 2019-03-12 RX ADMIN — FOLIC ACID 1 MG: 1 TABLET ORAL at 10:25

## 2019-03-12 RX ADMIN — PANTOPRAZOLE SODIUM 40 MG: 40 TABLET, DELAYED RELEASE ORAL at 10:25

## 2019-03-12 RX ADMIN — INSULIN GLARGINE 10 UNITS: 100 INJECTION, SOLUTION SUBCUTANEOUS at 10:25

## 2019-03-12 NOTE — DIABETES MGMT
GLYCEMIC CONTROL AND NUTRITION    Assessment/Recommendations:  Fasting lab glucose this am 88 mg/dl  Will continue inpatient monitoring. Contact info given to pts daughter for future diabetes questions/concerns. Most recent blood glucose values:  Results for Nile Mendes (MRN [id number]) as of 3/12/2019 11:27   Ref. Range 3/11/2019 12:00 3/11/2019 18:11 3/11/2019 21:16 3/12/2019 06:38 3/12/2019 11:20   GLUCOSE,FAST - POC Latest Ref Range: 70 - 110 mg/dL 242 (H) 174 (H) 270 (H) 101 171 (H)     Current A1C of 9.2 % is equivalent to average blood glucose of 217 mg/dl over the past 2-3 months. Current hospital diabetes medications:   lantus 10 units daily  Lispro corrective insulin coverage AC&HS  Previous day's insulin requirements:   Lantus 10 units   Lispro 16 units corrective insulin coverage  Home diabetes medications:  Glipizide 2.5 mg daily  Trajenta 5 mg bid  Diet:    Full liquid diet with supplements  Education:  _x__Refer to Diabetes Education Record             ____Education not indicated at this time  Was not sure if pt was going to be discharged home on insulin therapy. Patients daughter states that she just spoke with the doctor and that her mom would be going home on pills. Attempted to review the insulin pen with pt and daughter just in case pt needed insulin at home in the future, but the daughter declined.     [place] Yu

## 2019-03-12 NOTE — ROUTINE PROCESS
Bedside shift change report given to [address] [facility] [address] (oncoming nurse) by Toshia Renteria (offgoing nurse). Report included the following information SBAR, Kardex, Intake/Output, MAR, Recent Results and Quality Measures.

## 2019-03-12 NOTE — PROGRESS NOTES
Reason for Admission:   Anemia [D64.9]  GI (gastrointestinal bleed) [K92.2]               RRAT Score:     35             Resources/supports as identified by patient/family:       Top Challenges facing patient (as identified by patient/family and CM): Finances/Medication cost?     no  Transportation      Pt's daughter Luccile  Support system or lack thereof?   family  Living arrangements? Lives with her daughter Calin Nixon [id number], [id number]  Self-care/ADLs/Cognition? Self care but needs help with IADLs. She is alert and oriented       Current Advanced Directive/Advance Care Plan:   No                           Plan for utilizing home health:    yes                      Likelihood of readmission:   HIGH    Transition of Care Plan:                    Initial assessment completed with pt's daughter Lynn Martin. Cognitive status of patient: oriented to time, place, person and situation. Face sheet information confirmed:  yes. The patient designates her daughter Calin Nixon to participate in his/her discharge plan and to receive any needed information. This patient lives in a single family home with daughter. Patient is able to navigate steps as needed. Prior to hospitalization, patient was considered to be independent with ADLs : yes . If not independent,  patient needs assist with : IADLs    Patient has a current ACP document on file: no  The daughter will be available to transport patient home upon discharge. The patient already has  medical equipment available in the home. Patient is not currently active with home health. Patient has not stayed in a skilled nursing facility or rehab. This patient is on dialysis :no   Currently, the discharge plan is:  Pt has home health orders but pt's daughter declined. Informed Dr Sandie Lopez. The patient's daughter administered pt's medications as directed.     Patient's current insurance is [employer] Medicare part A&B and [facility]  Pt's pcp is Dr. Charlie Ortega [address] Management Interventions  PCP Verified by CM: Yes(per pt, she saw her pcp 3 weeks ago)  Palliative Care Criteria Met (RRAT>21 & CHF Dx)?: No  Mode of Transport at Discharge:  Other (see comment)(family)  Transition of Care Consult (CM Consult): Discharge Planning  Physical Therapy Consult: No  Occupational Therapy Consult: No  Speech Therapy Consult: No  Current Support Network: Relative's Home(pt lives with her daughter Denise Power)  Confirm Follow Up Transport: Family  Plan discussed with Pt/Family/Caregiver: Yes  Discharge Location  Discharge Placement: Home with family assistance    CIPRIANO Gomez RN  Care Management  Pager: [phone]

## 2019-03-12 NOTE — DIABETES MGMT
Diabetes Patient/Family Education Record  Factors That  May Influence Patients Ability  to Learn or  Comply with Recommendations   []   Language barrier    []   Cultural needs   []   Motivation    []   Cognitive limitation    []   Physical   [x]   Education    []   Physiological factors   []   Hearing/vision/speaking impairment   []   Taoist beliefs    []   Financial factors   []  Other:   []  No factors identified at this time. Person Instructed:   [x]   Patient   [x]   Family (daughter)   []  Other     Preference for Learning:   [x]   Verbal   [x]   Written   []  Demonstration     Level of Comprehension & Competence:    [x]  Good                                      [] Fair                                     []  Poor                             []  Needs Reinforcement   [x]  Teachback completed    Education Component:   [x]  Medication management, including how to administer insulin (if appropriate) and potential medication interactions    [x]  Nutritional management -obtain usual meal pattern basic carbohydrate counting reviewed, as well as the plate method and portion sizes   []  Exercise   [x]  Signs, symptoms, and treatment of hyperglycemia and hypoglycemia   [x] Prevention, recognition and treatment of hyperglycemia and hypoglycemia   [x]  Importance of blood glucose monitoring and how to obtain a blood glucose meter has a glucometer and supplies at home.    [x]  Instruction on use of the blood glucose meter   [x]  Discuss the importance of HbA1C monitoring 9.2% equivalent to an average blood glucose of 217 mg/dl over the past 2-3 months   []  Sick day guidelines   []  Proper use and disposal of lancets, needles, syringes or insulin pens (if appropriate)   [x]  Potential long-term complications (retinopathy, kidney disease, neuropathy, foot care)   [x] Information about whom to contact in case of emergency or for more information    [x]  Goal:  Patient/family will demonstrate understanding of Diabetes Self Management Skills by: (date) _03/12/19______  Plan for post-discharge education or self-management support:    [x] Outpatient class schedule provided            [] Patient Declined    [] Scheduled for outpatient classes (date) ___encouraged pts daughter to attend this Thursdays class at [facility] on Nutrition. ____  Verify:  Does patient understand how diabetes medications work? yes__________________________  Does patient know what their most recent A1c is? yes______________________________  Does patient monitor glucose at home? yes_______________________________________  Does patient have difficulty obtaining diabetes medications or testing supplies?  No_____________     Jenn Dinero RN CDE  Ext [phone]  [facility] [phone]

## 2019-03-12 NOTE — DIABETES MGMT
DIABETES EDUCATION:    Dr. Janis Mahajan requesting diabetes education prior to discharge. Noted patient was already seen by Yousif Escobar RN CDE, this morning. See separate notes. Seen patient this afternoon with her family at bedside. A1c of 9.2% (3/09/2019). Patient is not willing to add insulin to her diabetes medications. Noted pre adm med: Glipizide 2.5 mg once daily and Tradjenta 5 mg twice daily. Called Dr. Janis Mahajan and he will see patient and family member to address discharge medicine for diabetes. Encouraged patient to see her PCP for f/u regarding management of diabetes and medication.       Bev Perry RN  Pager: [phone]

## 2019-03-12 NOTE — DISCHARGE INSTRUCTIONS
Patient Education        Learning About ACE Inhibitors and ARBs for Diabetes  Introduction    ACE inhibitors and ARBs are medicines used to control blood pressure. They allow blood vessels to relax and open up. This lowers your blood pressure. When you have diabetes, taking an ACE inhibitor or ARB can help to:  · Treat high blood pressure. Your risk of problems from diabetes goes up when you have high blood pressure. · Prevent or slow kidney damage. Diabetes can damage the blood vessels in the kidneys. High blood pressure can damage the kidneys, too. · Lower the risks of stroke and heart attack. Your risks go up when you have high blood pressure, heart disease, or both. An ACE inhibitor or ARB is a good choice for people with diabetes. Unlike some medicines, these don't affect blood sugar levels. Examples  ACE inhibitors include:  · Benazepril. · Lisinopril. · Ramipril. ARBs include:  · Irbesartan. · Losartan. · Telmisartan. Possible side effects  All medicines can cause side effects. Some side effects of ACE inhibitors include:  · Low blood pressure. You may feel dizzy and weak. · A cough. · High potassium levels. · An allergic reaction of the skin. Symptoms may range from mild swelling to painful welts. Some side effects of ARBs include:  · Diarrhea. · High potassium levels. · Sinus problems. · Stomach problems. You may have other side effects or reactions not listed here. Check the information that comes with your medicine. What to know about taking this medicine  · Be safe with medicines. Take your medicines exactly as prescribed. Call your doctor if you think you are having a problem with your medicine. · Before starting an ACE inhibitor or ARB, tell your doctor if you:  ? Use a salt substitute. ? Take diuretics or potassium tablets. · These medicines are not safe for pregnancy. If you are pregnant or planning to be, talk to your doctor about a safe blood pressure medicine.   · ACE inhibitors can cause a dry cough. If the cough is bad, talk to your doctor. Switching to an ARB is likely to help. · Taking some medicines together can cause problems. Tell your doctor or pharmacist all the medicines you take. This includes over-the-counter medicines, vitamins, herbal products, and supplements. · You may need regular blood and urine tests. Where can you learn more? Go to http://curt-morena.info/. Enter M316 in the search box to learn more about \"Learning About ACE Inhibitors and ARBs for Diabetes. \"  Current as of: July 25, 2018  Content Version: 11.9  © 5767-1260 [employer]. Care instructions adapted under license by [employer] (which disclaims liability or warranty for this information). If you have questions about a medical condition or this instruction, always ask your healthcare professional. [address] any warranty or liability for your use of this information. Patient Education        Anemia From Heavy Bleeding: Care Instructions  Your Care Instructions    Anemia means that your body does not have enough red blood cells. Red blood cells carry oxygen around the body. When you have anemia, you may feel dizzy, tired, and weak. You may also feel your heart pounding. For some people, it's hard to focus and think clearly. One common cause of anemia is bleeding. Bleeding from ulcers, hemorrhoids, cancer, or other problems can cause anemia. It may also be caused by heavy menstrual periods. Your treatment may include iron pills. Iron helps your body make hemoglobin. Hemoglobin is the part of the red blood cell that carries oxygen. If you have severe anemia, you may need a blood transfusion to give you red blood cells as quickly as possible. Sometimes it takes several months to get iron levels back to normal.  Follow-up care is a key part of your treatment and safety.  Be sure to make and go to all appointments, and call your doctor if you are having problems. It's also a good idea to know your test results and keep a list of the medicines you take. How can you care for yourself at home? · Be safe with medicines. Take your medicines exactly as prescribed. Call your doctor if you think you are having a problem with your medicine. · Follow your doctor's advice about eating foods that have a lot of iron in them. These include red meat, shellfish, poultry, and eggs. They also include beans, raisins, whole-grain bread, and leafy green vegetables. · Steam your vegetables. This is the best way to prepare them if you want to get as much iron as possible. · Iron pills can cause constipation. If you take them, there are things you can do to avoid constipation. Drink plenty of fluids, eat foods with a lot of fiber, and exercise every day. When should you call for help? Call 911 anytime you think you may need emergency care. For example, call if:    · You passed out (lost consciousness).     · Your stools are maroon or very bloody.    Call your doctor now or seek immediate medical care if:    · You are short of breath.     · You have new or worse bleeding.     · You are dizzy or light-headed, or you feel like you may faint.    Watch closely for changes in your health, and be sure to contact your doctor if:    · You feel weaker or more tired than usual.     · You do not get better as expected. Where can you learn more? Go to http://curt-morena.info/. Enter Y707 in the search box to learn more about \"Anemia From Heavy Bleeding: Care Instructions. \"  Current as of: May 6, 2018  Content Version: 11.9  © 4766-0203 [employer]. Care instructions adapted under license by [email or web address] (which disclaims liability or warranty for this information).  If you have questions about a medical condition or this instruction, always ask your healthcare professional. [place] any warranty or liability for your use of this information. Patient Education        Anemia: Care Instructions  Your Care Instructions    Anemia is a low level of red blood cells, which carry oxygen throughout your body. Many things can cause anemia. Lack of iron is one of the most common causes. Your body needs iron to make hemoglobin, a substance in red blood cells that carries oxygen from the lungs to your body's cells. Without enough iron, the body produces fewer and smaller red blood cells. As a result, your body's cells do not get enough oxygen, and you feel tired and weak. And you may have trouble concentrating. Bleeding is the most common cause of a lack of iron. You may have heavy menstrual bleeding or bleeding caused by conditions such as ulcers, hemorrhoids, or cancer. Regular use of aspirin or other anti-inflammatory medicines (such as ibuprofen) also can cause bleeding in some people. A lack of iron in your diet also can cause anemia, especially at times when the body needs more iron, such as during pregnancy, infancy, and the teen years. Your doctor may have prescribed iron pills. It may take several months of treatment for your iron levels to return to normal. Your doctor also may suggest that you eat foods that are rich in iron, such as meat and beans. There are many other causes of anemia. It is not always due to a lack of iron. Finding the specific cause of your anemia will help your doctor find the right treatment for you. Follow-up care is a key part of your treatment and safety. Be sure to make and go to all appointments, and call your doctor if you are having problems. It's also a good idea to know your test results and keep a list of the medicines you take. How can you care for yourself at home? · Take your medicines exactly as prescribed. Call your doctor if you think you are having a problem with your medicine.   · If your doctor recommends iron pills, take them as directed:  ? Try to take the pills on an empty stomach about 1 hour before or 2 hours after meals. But you may need to take iron with food to avoid an upset stomach. ? Do not take antacids or drink milk or caffeine drinks (such as coffee, tea, or cola) at the same time or within 2 hours of the time that you take your iron. They can make it hard for your body to absorb the iron. ? Vitamin C (from food or supplements) helps your body absorb iron. Try taking iron pills with a glass of orange juice or some other food that is high in vitamin C, such as citrus fruits. ? Iron pills may cause stomach problems, such as heartburn, nausea, diarrhea, constipation, and cramps. Be sure to drink plenty of fluids, and include fruits, vegetables, and fiber in your diet each day. Iron pills often make your bowel movements dark or green. ? If you forget to take an iron pill, do not take a double dose of iron the next time you take a pill. ? Keep iron pills out of the reach of small children. An overdose of iron can be very dangerous. · Follow your doctor's advice about eating iron-rich foods. These include red meat, shellfish, poultry, eggs, beans, raisins, whole-grain bread, and leafy green vegetables. · Steam vegetables to help them keep their iron content. When should you call for help? Call 911 anytime you think you may need emergency care. For example, call if:    · You have symptoms of a heart attack. These may include:  ? Chest pain or pressure, or a strange feeling in the chest.  ? Sweating. ? Shortness of breath. ? Nausea or vomiting. ? Pain, pressure, or a strange feeling in the back, neck, jaw, or upper belly or in one or both shoulders or arms. ? Lightheadedness or sudden weakness. ? A fast or irregular heartbeat. After you call 911, the  may tell you to chew 1 adult-strength or 2 to 4 low-dose aspirin. Wait for an ambulance.  Do not try to drive yourself.     · You passed out (lost consciousness).    Call your doctor now or seek immediate medical care if:    · You have new or increased shortness of breath.     · You are dizzy or lightheaded, or you feel like you may faint.     · Your fatigue and weakness continue or get worse.     · You have any abnormal bleeding, such as:  ? Nosebleeds. ? Vaginal bleeding that is different (heavier, more frequent, at a different time of the month) than what you are used to.  ? Bloody or black stools, or rectal bleeding. ? Bloody or pink urine.    Watch closely for changes in your health, and be sure to contact your doctor if:    · You do not get better as expected. Where can you learn more? Go to http://curt-morena.info/. Enter R301 in the search box to learn more about \"Anemia: Care Instructions. \"  Current as of: May 6, 2018  Content Version: 11.9  © 1610-8854 [employer]. Care instructions adapted under license by [employer] (which disclaims liability or warranty for this information). If you have questions about a medical condition or this instruction, always ask your healthcare professional. [place] [address] any warranty or liability for your use of this information. Patient Education        Learning About Blood Transfusions  What is a blood transfusion? Blood transfusion is a medical treatment to replace the blood or parts of blood that your body has lost. The blood goes through a tube from a bag to an intravenous (IV) catheter and into your vein. You may need a blood transfusion after losing blood from an injury, a major surgery, an illness that causes bleeding, or an illness that destroys blood cells. Transfusions are also used to give you the parts of blood--such as platelets, plasma, or substances that cause clotting--that your body needs to fight an illness or stop bleeding. How is a blood transfusion done?   Before you receive a blood transfusion, your blood is tested to find out what your blood type is. Blood or blood parts that are a match with your blood type are ordered by your doctor. Blood is typed as A, B, AB, or O. It is also typed as Rh-positive or Rh-negative. Your blood is also screened to look for antibodies that might react with the blood that is given to you. The blood you are getting is checked and rechecked to make sure that it's the right type for you. A sample of your blood is mixed with a sample of the blood you will receive to check for problems. Before actually giving you the transfusion, a doctor and nurses will look at the label on the package of blood and compare it to your hospital ID bracelet and medical records. The transfusion begins only when all agree that this is the correct blood and that you are the correct person to receive it. To receive the transfusion, you will have an intravenous (IV) catheter inserted into a vein. A tube connects the catheter to the bag containing the blood, which is placed higher than your body. The blood then flows slowly into your vein. A doctor or nurse will check you several times during the transfusion to watch for a reaction or other problems. What are the possible risks? Blood transfusions have many benefits and are often life-saving. But they also have a few risks. Possible risks include:  · Your body's reaction to receiving new blood. This may include:  ? Fever. ? Allergic reactions. ? Breathing problems. · An infection from the blood. This risk is small because of the strict rules placed on handling and storing blood. Getting a viral infection, such as HIV or hepatitis B or C, through blood transfusions has become very rare. The U.S. Food and Drug Administration (FDA) enforces strict guidelines on the collection, testing, storage, and use of blood. · Getting the wrong blood type by accident. Severe reactions, which can be life-threatening, are very rare. What can you expect after a blood transfusion?   Here are some things you can do at home to help prevent infection at the transfusion site:  · Wash the area daily with warm, soapy water, and pat it dry. Don't use hydrogen peroxide or alcohol, which can slow healing. You may cover the area with a gauze bandage if it weeps or rubs against clothing. Change the bandage every day. · Keep the area clean and dry. When should you call for help? Call 911 anytime you think you may need emergency care. For example, call if:  · You have severe trouble breathing. Call your doctor now or seek immediate medical care if:  · You have a fever. · You feel weaker or more tired than usual.  · You have a yellow tint to your skin or the whites of your eyes. Watch closely for changes in your health, and be sure to contact your doctor if you have any problems. Follow-up care is a key part of your treatment and safety. Be sure to make and go to all appointments, and call your doctor if you are having problems. It's also a good idea to know your test results and keep a list of the medicines you take. Where can you learn more? Go to http://curt-morena.info/. Enter V588 in the search box to learn more about \"Learning About Blood Transfusions. \"  Current as of: May 6, 2018  Content Version: 11.9  © 4570-1356 [employer]. Care instructions adapted under license by [employer] (which disclaims liability or warranty for this information). If you have questions about a medical condition or this instruction, always ask your healthcare professional. [place] [address] any warranty or liability for your use of this information. Patient Education        Learning About Atrial Fibrillation  What is atrial fibrillation? Atrial fibrillation (say \"AY-tree-marybeth [id number]-ycpg-ZFO-shun\") is the most common type of irregular heartbeat (arrhythmia). Normally, the heart beats in a strong, steady rhythm.  In atrial fibrillation, a problem with the heart's electrical system causes the two upper parts of the heart (the atria) to quiver, or fibrillate. Your heart rate also may be faster than normal.  Atrial fibrillation can be dangerous because if the heartbeat isn't strong and steady, blood can collect, or pool, in the atria. And pooled blood is more likely to form clots. Clots can travel to the brain, block blood flow, and cause a stroke. Atrial fibrillation can also lead to heart failure. Treatment for atrial fibrillation helps prevent stroke and heart failure. It also helps relieve symptoms. Atrial fibrillation is often caused by another heart problem. It may happen after heart surgery. It may also be caused by other problems, such as an overactive thyroid gland or lung disease. Many people with atrial fibrillation are able to live full and active lives. What are the symptoms? Some people feel symptoms when they have episodes of atrial fibrillation. But other people don't notice any symptoms. If you have symptoms, you may feel:  · A fluttering, racing, or pounding feeling in your chest called palpitations. · Weak or tired. · Dizzy or lightheaded. · Short of breath. · Chest pain. · Confused. You may notice signs of atrial fibrillation when you check your pulse. Your pulse may seem uneven or fast.  What can you expect when you have atrial fibrillation? At first, spells of atrial fibrillation may come on suddenly and last a short time. It may go away on its own or it goes away after treatment. This is called paroxysmal atrial fibrillation. Over time, the spells may last longer and occur more often. They often don't go away on their own. How is it treated? Treatments can help you feel better and prevent future problems, especially stroke and heart failure. The main types of treatment slow the heart rate, control the heart rhythm, and help prevent stroke. Your treatment will depend on the cause of your atrial fibrillation, your symptoms, and your risk for stroke.   · Heart rate treatment. Medicine may be used to slow your heart rate. Your heartbeat may still be irregular. But these medicines keep your heart from beating too fast. They may also help relieve your symptoms. · Heart rhythm treatment. Different treatments may be used to try to stop atrial fibrillation and keep it from returning. They can also relieve symptoms. These treatments include medicine, electrical cardioversion to shock the heart back to a normal rhythm, a procedure called catheter ablation, and heart surgery. · Stroke prevention. You and your doctor can decide how to lower your risk. You may decide to take a blood-thinning medicine, such as aspirin or an anticoagulant. How can you live well with it? You can live well and help manage atrial fibrillation by having a heart-healthy lifestyle. To have a heart-healthy lifestyle:  · Don't smoke. · Eat heart-healthy foods. · Be active. Talk to your doctor about what type and level of exercise is safe for you. · Stay at a healthy weight. Lose weight if you need to. · Manage stress. · Avoid alcohol if it triggers symptoms. · Manage other health problems such as high blood pressure, high cholesterol, and diabetes. · Avoid getting sick from the flu. Get a flu shot every year. Where can you learn more? Go to http://curtNight Outmorena.info/. Enter [id number] in the search box to learn more about \"Learning About Atrial Fibrillation. \"  Current as of: July 22, 2018  Content Version: 11.9  © 6191-8923 Robert Applebaum MD. Care instructions adapted under license by [employer] (which disclaims liability or warranty for this information). If you have questions about a medical condition or this instruction, always ask your healthcare professional. [place] [address] any warranty or liability for your use of this information.          Patient Education        Learning About Certified Diabetes Educators  What is a certified diabetes educator? Certified diabetes educators are health professionals who have special training to help you manage your diabetes. They may be:  · Registered nurses. · Registered dietitians. · Pharmacists. · Social workers. · Doctors. Your diabetes educator will give you tips and help with daily diabetes care. He or she also may teach classes. These classes give you a chance to learn from and connect with others who have diabetes. Why see a diabetes educator? Your doctor wants you to get the personal support and help that a diabetes educator can give. And most insurance plans will cover part or all of the cost.  Learning is a key part of living with diabetes. A diabetes educator teaches about the most important parts of your care. You will learn about:  · Eating healthy meals. · Being active. · Taking medicine. · Checking your blood sugar. · Dealing with your feelings about having diabetes. He or she can help you find ways to live better with diabetes. And your diabetes educator can show you small changes that can make a big difference in your daily routine and your health. If you need to make a big change, he or she will be able to answer your questions and guide you though each step. When should you see one? It can be helpful to see a diabetes educator at certain points in your care. He or she can:  · Get you started when you're first diagnosed with diabetes. · Check in once a year for a review of your health and daily routine. · Show you how to handle a new health problem along with your diabetes. · Help you work with a new health care team.  Follow-up care is a key part of your treatment and safety. Be sure to make and go to all appointments, and call your doctor if you are having problems. It's also a good idea to know your test results and keep a list of the medicines you take. Where can you learn more? Go to http://curt-morena.info/.   Enter J719 in the search box to learn more about \"Learning About Certified Diabetes Educators. \"  Current as of: July 25, 2018  Content Version: 11.9  © 4552-0502 [employer]. Care instructions adapted under license by [employer] (which disclaims liability or warranty for this information). If you have questions about a medical condition or this instruction, always ask your healthcare professional. [address] any warranty or liability for your use of this information. Patient Education        Learning About Foot and Toenail Care  Foot and toenail care: Overview  Checking your loved one's feet and keeping them clean and soft can help prevent cracks and infection in the skin. This is especially important for people who have diabetes. Keeping toenails trimmed--and polished if that's what the person likes--also helps the person feel well-groomed. If the person you care for has diabetes or has foot problems, such as bad bunions and corns, think about taking them to see a podiatrist. This is a doctor who specializes in the care of the feet. Sometimes a podiatrist will come to the home if the person can't go out for visits. Try to take the person for salon pedicures if that is what they want. It's a chance to get out and see people and continue a favorite activity. You can do basic nail care at home. Usually all you need to do is keep the nails clean and at a safe length. How do you trim someone's toenails? Try to trim the person's nails every week. Or check the nails each week to see if they need to be trimmed. It's easiest to trim nails after the person has had a shower or foot bath. It makes the nails softer and easier to trim. Start by gathering your supplies. You will need toenail clippers and a nail file. You may also need nail polish and nail polish remover. To trim the nails:  1. Wash and dry your hands. You don't need to wear gloves.   2. Use nail polish remover to take off any polish. 3. Hold the person's foot and toe steady with one hand while you trim the nail with your other hand. Trim the nails straight across. Leave the nails a little longer at the corners so that the sharp ends don't cut into the skin. 4. Keep the nails no longer than the tip of the toes. 5. Let the nails dry if they are still damp and soft. 6. Use a nail file to gently smooth the edges of the nails, especially at the corners. They may be sharp after the nails are cut straight. 7. Apply nail polish, if the person wants it. If the person's nails are thick and discolored, it may be safest to have a podiatrist cut them. What else do you need to know? When you're caring for someone's nails, it is important to remember not to trim or cut the cuticles. A minor cut in a cuticle could lead to an infection. Wash the feet daily in the shower or bath or in a basin made for washing feet. It's extra important to wash the feet carefully if the person has diabetes. After washing the feet, dry gently. Put lotion on the feet, especially on the heels. But don't put it between the toes. If the person doesn't have diabetes and you see signs of athlete's foot (such as dry, cracking, or itchy skin between the toes), you can try an over-the-counter medicine. These medicines can kill the fungus that causes athlete's foot. If the problem doesn't go away, talk to the person's doctor. Look every day for cuts or signs of infection, such as pain, swelling, redness, or warmth. If you see any of these signs--especially in someone who has diabetes--call the doctor. Where can you learn more? Go to http://curt-morena.info/. Enter A726 in the search box to learn more about \"Learning About Foot and Toenail Care. \"  Current as of: April 18, 2018  Content Version: 11.9  © 4670-7661 [employer].  Care instructions adapted under license by [employer] (which disclaims liability or warranty for this information). If you have questions about a medical condition or this instruction, always ask your healthcare professional. [address] any warranty or liability for your use of this information. Patient Education        Colonoscopy: Before Your Procedure  What is a colonoscopy? A colonoscopy is a test that lets a doctor look inside your colon. The doctor uses a thin, lighted tube called a colonoscope to look for problems. These include small growths called polyps, cancer, or bleeding. During the test, the doctor can take samples of tissue that can be checked for cancer or other problems. This is called a biopsy. The doctor can also take out polyps. Before the test, you will need to stop eating solid foods. You also will drink a liquid or take a tablet that cleans out your colon. This helps your doctor be able to see inside your colon during the test.  Follow-up care is a key part of your treatment and safety. Be sure to make and go to all appointments, and call your doctor if you are having problems. It's also a good idea to know your test results and keep a list of the medicines you take. What happens before the procedure?   Preparing for the procedure    · Understand exactly what procedure is planned, along with the risks, benefits, and other options. · Tell your doctors ALL the medicines, vitamins, supplements, and herbal remedies you take. Some of these can increase the risk of bleeding or interact with anesthesia.     · If you take blood thinners, such as warfarin (Coumadin), clopidogrel (Plavix), or aspirin, be sure to talk to your doctor. He or she will tell you if you should stop taking these medicines before your procedure. Make sure that you understand exactly what your doctor wants you to do.     · Your doctor will tell you which medicines to take or stop before your procedure. You may need to stop taking certain medicines a week or more before the procedure.  So talk to your doctor as soon as you can.     · If you have an advance directive, let your doctor know. It may include a living will and a durable power of  for health care. Bring a copy to the hospital. If you don't have one, you may want to prepare one. It lets your doctor and loved ones know your health care wishes. Doctors advise that everyone prepare these papers before any type of surgery or procedure.    Before the procedure    · Follow your doctor's directions about when to stop eating solid foods and drink only clear liquids. You can drink water, clear juices, clear broths, flavored ice pops, and gelatin (such as Jell-O). Do not eat or drink anything red or purple. This includes grape juice and grape-flavored ice pops. It also includes fruit punch and cherry gelatin.     · Drink the \"colon prep\" liquid as your doctor tells you. You will want to stay home, because the liquid will make you go to the bathroom a lot. Your stools will be loose and watery. It is very important to drink all of the liquid. If you have problems drinking it, call your doctor. Some doctors may have you take a tablet rather than drink a liquid.     · Do not eat any solid foods after you drink the colon prep.     · Stop drinking clear liquids 6 to 8 hours before the test.   Procedures can be stressful. This information will help you understand what you can expect. And it will help you safely prepare for your procedure. What happens on the day of the procedure? · Follow the instructions exactly about when to stop eating and drinking. If you don't, your procedure may be canceled. If your doctor told you to take your medicines on the day of the procedure, take them with only a sip of water.     · Take a bath or shower before you come in for your procedure. Do not apply lotions, perfumes, deodorants, or nail polish.     · Take off all jewelry and piercings.  And take out contact lenses, if you wear them.    At the doctor's office or hospital   · Bring a picture ID.     · You will be kept comfortable and safe by your anesthesia provider. The anesthesia may make you sleep.     · You will lie on your back or your side with your knees drawn up toward your belly. The doctor will gently put a gloved finger into your anus. Then the doctor puts the scope in and moves it into your colon. The scope goes in easily because it is lubricated.     · The doctor may also use small tools to take tissue samples for a biopsy or to remove polyps. This does not hurt.     · The test usually takes 30 to 45 minutes. But it may take longer. It depends on what is found and what is done. Going home   · Be sure you have someone to drive you home. Anesthesia and pain medicine make it unsafe for you to drive.     · You will be given more specific instructions about recovering from your procedure. When should you call your doctor? · You have questions or concerns.     · You don't understand how to prepare for your procedure.     · You are having trouble with the bowel prep.     · You become ill before the procedure (such as fever, flu, or a cold).     · You need to reschedule or have changed your mind about having the procedure. Where can you learn more? Go to http://curt-morena.info/. Enter C315 in the search box to learn more about \"Colonoscopy: Before Your Procedure. \"  Current as of: March 27, 2018  Content Version: 11.9  © 0444-2042 [employer]. Care instructions adapted under license by [employer] (which disclaims liability or warranty for this information). If you have questions about a medical condition or this instruction, always ask your healthcare professional. [place] [address] any warranty or liability for your use of this information. Patient Education     Learning About Diabetes and Your Teeth  How does diabetes affect your teeth and gums?   When you have diabetes, managing blood sugar levels and taking good care of your teeth and gums are both important. When blood sugar levels are high, there's a greater risk for:  · Gum (periodontal) disease. · Tooth decay. · Fungal infections in the mouth, like thrush. · Dry mouth, or xerostomia (say \"janeth-ryan-STO-frank-esha\"). The mouth needs saliva to neutralize the acids in your mouth. These acids can lead to gum disease and tooth decay. Keeping your blood sugar levels in your target range can help prevent problems with the teeth and gums. If you have any problems with your teeth or gums, see your dentist.  How do you care for your teeth and gums when you have diabetes? · Brush your teeth twice a day. · Floss daily. Make sure to press the floss against your teeth and not your gums. · Check each day for areas where your gums might be red or painful. Be sure to let your dentist know of any sores in your mouth. · See your dentist regularly for professional cleaning of your teeth and to look for gum problems. Many dentists recommend getting checkups twice a year. Remind your dentist that you have diabetes before any work is done. · Don't smoke or use smokeless tobacco. Tobacco use with diabetes can lead to a greater risk of severe gum disease. If you need help quitting, talk to your doctor about stop-smoking programs and medicines. These can increase your chances of quitting for good. Follow-up care is a key part of your treatment and safety. Be sure to make and go to all appointments, and call your doctor if you are having problems. It's also a good idea to know your test results and keep a list of the medicines you take. Where can you learn more? Go to MyTraining.pro.be  Enter H523 in the search box to learn more about \"Learning About Diabetes and Your Teeth. \"   © 5093-7818 [employer]. Care instructions adapted under license by [facility] (which disclaims liability or warranty for this information).  This care instruction is for use with your licensed healthcare professional. If you have questions about a medical condition or this instruction, always ask your healthcare professional. [place] [address] any warranty or liability for your use of this information. Content Version: 03.4.032442; Current as of: May 22, 2015           Patient Education        Colonoscopy: What to Expect at [address]  After you have a colonoscopy, you will stay at the clinic for 1 to 2 hours until the medicines wear off. Then you can go home. But you will need to arrange for a ride. Your doctor will tell you when you can eat and do your other usual activities. Your doctor will talk to you about when you will need your next colonoscopy. Your doctor can help you decide how often you need to be checked. This will depend on the results of your test and your risk for colorectal cancer. After the test, you may be bloated or have gas pains. You may need to pass gas. If a biopsy was done or a polyp was removed, you may have streaks of blood in your stool (feces) for a few days. Problems such as heavy rectal bleeding may not occur until several weeks after the test. This isn't common. But it can happen after polyps are removed. This care sheet gives you a general idea about how long it will take for you to recover. But each person recovers at a different pace. Follow the steps below to get better as quickly as possible. How can you care for yourself at home? Activity    · Rest when you feel tired.     · You can do your normal activities when it feels okay to do so. Diet    · Follow your doctor's directions for eating.     · Unless your doctor has told you not to, drink plenty of fluids. This helps to replace the fluids that were lost during the colon prep.     · Do not drink alcohol. Medicines    · Your doctor will tell you if and when you can restart your medicines.  He or she will also give you instructions about taking any new medicines.     · If you take blood thinners, such as warfarin (Coumadin), clopidogrel (Plavix), or aspirin, be sure to talk to your doctor. He or she will tell you if and when to start taking those medicines again. Make sure that you understand exactly what your doctor wants you to do.     · If polyps were removed or a biopsy was done during the test, your doctor may tell you not to take aspirin or other anti-inflammatory medicines for a few days. These include ibuprofen (Advil, Motrin) and naproxen (Aleve). Other instructions    · For your safety, do not drive or operate machinery until the medicine wears off and you can think clearly. Your doctor may tell you not to drive or operate machinery until the day after your test.     · Do not sign legal documents or make major decisions until the medicine wears off and you can think clearly. The anesthesia can make it hard for you to fully understand what you are agreeing to. Follow-up care is a key part of your treatment and safety. Be sure to make and go to all appointments, and call your doctor if you are having problems. It's also a good idea to know your test results and keep a list of the medicines you take. When should you call for help? Call 911 anytime you think you may need emergency care. For example, call if:    · You passed out (lost consciousness).     · You pass maroon or bloody stools.     · You have trouble breathing.    Call your doctor now or seek immediate medical care if:    · You have pain that does not get better after you take pain medicine.     · You are sick to your stomach or cannot drink fluids.     · You have new or worse belly pain.     · You have blood in your stools.     · You have a fever.     · You cannot pass stools or gas.    Watch closely for changes in your health, and be sure to contact your doctor if you have any problems. Where can you learn more? Go to http://curt-morena.info/.   Enter E264 in the search box to learn more about \"Colonoscopy: What to Expect at Home. \"  Current as of: March 27, 2018  Content Version: 11.9  © 9346-6697 [employer]. Care instructions adapted under license by [employer] (which disclaims liability or warranty for this information). If you have questions about a medical condition or this instruction, always ask your healthcare professional. [address] any warranty or liability for your use of this information. Patient Education        Noninsulin Medicines for Type 2 Diabetes: Care Instructions  Your Care Instructions    There are different types of noninsulin medicines for diabetes. Each works in a different way. But they all help you control your blood sugar. Some types help your body make insulin to lower your blood sugar. Others lower how much insulin your body needs. Some can slow how fast your body digests sugars. And some can remove extra glucose through your urine. · Alpha-glucosidase inhibitors. These keep starches from breaking down. This means that they lower the amount of glucose absorbed when you eat. They don't help your body make more insulin. So they will not cause low blood sugar unless you use them with other medicines for diabetes. They include acarbose and miglitol. · DPP-4 inhibitors. These help your body raise the level of insulin after you eat. They also help your body make less of a hormone that raises blood sugar. They include linagliptin, saxagliptin, and sitagliptin. · Incretin hormones (GLP-1 receptor agonists). Your body makes a protein that can raise your insulin level. It also can lower your blood sugar and make you less hungry. You can get shots of hormones that work the same way. They include exenatide and liraglutide. · Meglitinides. These help your body release insulin. They also help slow how your body digests sugars.  So they can keep your blood sugar from rising too fast after you eat. They include nateglinide and repaglinide. · Metformin. This lowers how much glucose your liver makes. And it helps you respond better to insulin. It also lowers the amount of stored sugar that your liver releases when you are not eating. · SGLT2 inhibitors. These help to remove extra glucose through your urine. They may also help some people lose weight. They include canagliflozin, dapagliflozin, and empagliflozin. · Sulfonylureas. These help your body release more insulin. Some work for many hours. They can cause low blood sugar if you don't eat as you planned. They include glipizide and glyburide. · Thiazolidinediones. These reduce the amount of blood glucose. They also help you respond better to insulin. They include pioglitazone and rosiglitazone. You may need to take more than one medicine for diabetes. Two or more medicines may work better to lower your blood sugar level than just one does. Follow-up care is a key part of your treatment and safety. Be sure to make and go to all appointments, and call your doctor if you are having problems. It's also a good idea to know your test results and keep a list of the medicines you take. How can you care for yourself at home? · Eat a healthy diet. Get some exercise each day. This may help you to reduce how much medicine you need. · Do not take other prescription or over-the-counter medicines, vitamins, herbal products, or supplements without talking to your doctor first. Some medicines for type 2 diabetes can cause problems with other medicines or supplements. · Tell your doctor if you plan to get pregnant. Some of these drugs are not safe for pregnant women. · Be safe with medicines. Take your medicines exactly as prescribed. Meglitinides and sulfonylureas can cause your blood sugar to drop very low. Call your doctor if you think you are having a problem with your medicine. · Check your blood sugar often. You can use a glucose monitor.  Keeping track can help you know how certain foods, activities, and medicines affect your blood sugar. And it can help you keep your blood sugar from getting so low that it's not safe. When should you call for help? Call 911 anytime you think you may need emergency care. For example, call if:    · You passed out (lost consciousness).     · You are confused or cannot think clearly.     · Your blood sugar is very high or very low.    Watch closely for changes in your health, and be sure to contact your doctor if:    · Your blood sugar stays outside the level your doctor set for you.     · You have any problems. Where can you learn more? Go to http://curt-morena.info/. Enter H153 in the search box to learn more about \"Noninsulin Medicines for Type 2 Diabetes: Care Instructions. \"  Current as of: July 25, 2018  Content Version: 11.9  © 3673-8740 [employer]. Care instructions adapted under license by [employer] (which disclaims liability or warranty for this information). If you have questions about a medical condition or this instruction, always ask your healthcare professional. [place] [address] any warranty or liability for your use of this information. Patient Education        Gastrointestinal Bleeding: Care Instructions  Your Care Instructions    The digestive or gastrointestinal tract goes from the mouth to the anus. It is often called the GI tract. Bleeding can happen anywhere in the GI tract. It may be caused by an ulcer, an infection, or cancer. It may also be caused by medicines such as aspirin or ibuprofen. Light bleeding may not cause any symptoms at first. But if you continue to bleed for a while, you may feel very weak or tired. Sudden, heavy bleeding means you need to see a doctor right away. This kind of bleeding can be very dangerous. But it can usually be cured or controlled.  The doctor may do some tests to find the cause of your bleeding. Follow-up care is a key part of your treatment and safety. Be sure to make and go to all appointments, and call your doctor if you are having problems. It's also a good idea to know your test results and keep a list of the medicines you take. How can you care for yourself at home? · Be safe with medicines. Take your medicines exactly as prescribed. Call your doctor if you think you are having a problem with your medicine. You will get more details on the specific medicines your doctor prescribes. · Do not take aspirin or other anti-inflammatory medicines, such as naproxen (Aleve) or ibuprofen (Advil, Motrin), without talking to your doctor first. Ask your doctor if it is okay to use acetaminophen (Tylenol). · Do not drink alcohol. · The bleeding may make you lose iron. So it's important to eat foods that have a lot of iron. These include red meat, shellfish, poultry, and eggs. They also include beans, raisins, whole-grain breads, and leafy green vegetables. If you want help planning meals, you can make an appointment with a dietitian. When should you call for help? Call 911 anytime you think you may need emergency care. For example, call if:    · You have sudden, severe belly pain.     · You vomit blood or what looks like coffee grounds.     · You passed out (lost consciousness).     · Your stools are maroon or very bloody.    Call your doctor now or seek immediate medical care if:    · You are dizzy or lightheaded, or you feel like you may faint.     · Your stools are black and look like tar, or they have streaks of blood.     · You have belly pain.     · You vomit or have nausea.     · You have trouble swallowing, or it hurts when you swallow.    Watch closely for changes in your health, and be sure to contact your doctor if:    · You do not get better as expected. Where can you learn more? Go to http://curt-morena.info/.   Enter R437 in the search box to learn more about \"Gastrointestinal Bleeding: Care Instructions. \"  Current as of: September 23, 2018  Content Version: 11.9  © 2757-5391 [employer]. Care instructions adapted under license by [employer] (which disclaims liability or warranty for this information). If you have questions about a medical condition or this instruction, always ask your healthcare professional. [place] [address] any warranty or liability for your use of this information.

## 2019-03-12 NOTE — DISCHARGE SUMMARY
Hospitalist Discharge Summary    Patient: Twyla Elias MRN: [id number]  CSN: [id number]    YOB: 1930  Age: 80 y.o. Sex: female    DOA: 3/8/2019 LOS:  LOS: 3 days   Discharge Date:      Admission Diagnoses: Anemia [D64.9]  GI (gastrointestinal bleed) [K92.2]    Discharge Diagnoses:    1. GIB- likely lower   2. Anemia 2y to acute hemorrhage   3. T2DM - uncontrolled   4. Moderate to severe malnutrition     Discharge Condition: Fair    Discharge To: Home with [facility] Course:   Twyla Elias is a 80 y.o. female who has a past medical history of diabetes, valvular heart disease, pulmonary hypertension, atrial fibrillation but not on anticoagulation.     Patient came to the emergency room today after receiving a call from her doctor's office that her blood sugar was greater than 500.     Upon arrival to ED, she also complained of fatigue. She's also accompanied by her daughter who states that the patient has been more somnolent he has a history of dark stools but attributes it to use of magnesium and Pepto-Bismol. No gross bleeding.     Hemoglobin upon presentation was 7.2 and repeat was 6.4. Patient denied any blood loss. She has had no colonoscopies and lifetime.     Denied poly symptoms     Continues to have bilateral lower extremity edema, no chest pain, no orthopnea or PND     No abdominal pain, no nausea or vomiting, no recent weight loss reported. Her appetite     Pt admitted to the hospital with LGIB & Anemia & elevated BS   She underwent an EGD which did not show any active bleeding site   Anemia - She was typed & cross matched & transfused 1 unit PRBC   T2DM - uncontrolled with A1c 9.3 - she underwent diabetic education - I discussed with pt & daughter who mentions that they would not prefer to take insulin - her Glipizde dose was increased & she is advised to f/u with her PCP.   Discharge plan discussed with daughter by bedside & son on the phone - verbalized understanding Consults: Gastroenterology    Significant Diagnostic Studies: EGD - Reviewed     Current Discharge Medication List      START taking these medications    Details   pantoprazole (PROTONIX) 40 mg tablet Take 1 Tab by mouth Daily (before breakfast). Qty: 30 Tab, Refills: 0         CONTINUE these medications which have CHANGED    Details   furosemide (LASIX) 40 mg tablet Take 1 tab po BID  Qty: 120 Tab, Refills: 6      glipiZIDE SR (GLUCOTROL XL) 5 mg CR tablet Take 1 Tab by mouth daily. Qty: 30 Tab, Refills: 0         CONTINUE these medications which have NOT CHANGED    Details   spironolactone (ALDACTONE) 25 mg tablet Take one tablet by mouth daily  Qty: 30 Tab, Refills: 6      metoprolol tartrate (LOPRESSOR) 50 mg tablet Take 1 Tab by mouth two (2) times a day. Qty: 180 Tab, Refills: 3      potassium chloride SR (KLOR-CON 10) 10 mEq tablet Take 1 Tab by mouth daily. Qty: 1 Tab, Refills: 0      TRADJENTA 5 mg tablet Take 5 mg by mouth two (2) times a day. aspirin 81 mg chewable tablet Take one tab daily-START 11/8/13  Qty: 30 Tab, Refills: 0      conjugated estrogens (PREMARIN) 0.3 mg tablet Take 0.3 mg by mouth daily. folic acid (FOLVITE) 1 mg tablet Take 1 mg by mouth daily.              Activity: Activity as tolerated    Diet: Diabetic Diet    Wound Care: None needed    Follow-up: with PCP in 2 weeks     Barry Bernard MD  3/12/2019, 12:56 PM

## 2019-03-12 NOTE — PROGRESS NOTES
Reason for Admission:   Anemia [D64.9]  GI (gastrointestinal bleed) [K92.2]               RRAT Score:     ***             Resources/supports as identified by patient/family:       Top Challenges facing patient (as identified by patient/family and CM):     ***    Finances/Medication cost?     ***  Transportation      ***  Support system or lack thereof? ***  Living arrangements? ***   Self-care/ADLs/Cognition? ***        Current Advanced Directive/Advance Care Plan:   {yes no:35964}                          Plan for utilizing home health:    {yes no:68041}                      Likelihood of readmission:   HIGH    Transition of Care Plan:                    Initial assessment completed with {Information source:56025}. Cognitive status of patient: {ORIENTATION:77101}. Face sheet information confirmed:  {yes no:05038}. The patient designates *** to participate in his/her discharge plan and to receive any needed information. This patient lives in a {dwelling type:[facility]} with {Relatives - adult:5061::\"patient\"}. Patient {IS/IS NOT:25909} able to navigate steps as needed. Prior to hospitalization, patient was considered to be independent with ADLs/IADLS : {yes CR:31879} . If not independent,  patient needs assist with : {SELF CSTZ:27805076}    Patient has a current ACP document on file: {yes no:49983}  The {Relatives - adult:5061::\"patient\"} will be available to transport patient home upon discharge. The patient already has {OT Home Equipment:06806} {CPAP/BIPAP:} medical equipment available in the home. Patient {IS/IS NOT:79501} currently active with home health. If active, agency name is ***. Patient {has/has not:38964} stayed in a skilled nursing facility or rehab. Was  stay within last 60 days : {yes no:93612}.       This patient is on dialysis :{yes no:90796}  If yes, {DIALYSIS ZIW} patient and receives treatment on {DIALYSIS DAYS:55587020} at [facility] Template:20061::\"home\",\"[facility]  [phone]\",\"[facility] [phone]\",\"[facility] [phone]\",\"[facility] [place] [phone]\",\"[facility] [phone]\",\"[facility] [address] [place] [address] [phone]\",\"[facility] [phone]\"}  Chair time is ***. Pt is transported to/from dialysis by ***. List of available {[facility] Template:20061::\"Home Health\",\"SNF\"} agencies were provided and reviewed with the patient prior to discharge. Freedom of choice signed: {yes no:39704}, for ***. Currently, the discharge plan is {DISCHARGE MANAGEMENT:12446}. The patient states that she can obtain her medications from the pharmacy, and take her medications as directed. Patient's current insurance is ***. Care Management Interventions  PCP Verified by CM: Yes(per pt, she saw her pcp 3 weeks ago)  Palliative Care Criteria Met (RRAT>21 & CHF Dx)?: No  Mode of Transport at Discharge:  Other (see comment)(family)  Transition of Care Consult (CM Consult): Discharge Planning  Physical Therapy Consult: No  Occupational Therapy Consult: No  Speech Therapy Consult: No  Current Support Network: Relative's Home(pt lives with her daughter Pearletha Duane)  Confirm Follow Up Transport: Family  Plan discussed with Pt/Family/Caregiver: Yes  Discharge Location  Discharge Placement: Home with family assistance        ***

## 2019-04-20 NOTE — PROGRESS NOTES
Lin Aguila presents today for follow-up after her Lasix was increased to 80mg in the morning and 40mg in the evening for complaints of a 16 pound weight gain and lower extremity edema. Her NT pro-BNP was 2836. She was last seen by Dr. Bishnu Argueta on March 7, 2019. Since that visit, she was hospitalized from March 8, 2019 through March 11, 2019 for anemia secondary to GI bleed. She was originally sent to the emergency room by her primary care physician as her blood sugar was noted to be greater than 500. While there, she complained of fatigue and history of dark stools but no gross bleeding. Her hemoglobin was noted to be initially 7.2 and a repeat was 6.4. She received 1 unit of packed red blood cells and was started on iron supplementation. According to her daughter, she was also diuresed while she was in the hospital however, we did not follow her during her hospital stay. She is an 80year old female with a history of atrial fibrillation with RVR, hypertension, hyperlipidemia, diastolic heart failure, pulmonary hypertension, and diabetes. She has a history of a syncopal episode believed to be due symptomatic bradycardia related to a combination of digoxin and atenolol which was ultimately discontinued. Prior to that, she had episodes of atrial fibrillation with rapid ventricular response which was very responsive to digoxin but it also made her bradycardic with a pause as long as 7.4 seconds noted on Holter. She refused implantable pacemaker for some time but then finally agreed and a Medtronic [place] single chamber pacemaker was implanted on Oct. 31, 2013. She wore a Holter in August 2016 and showed atrial flutter 100% of recording time. Her last echocardiogram was done in October 2018 which showed a ejection fraction 56-60%, no wall motion abnormalities, and an RVSP around 50 mmHg. She had moderate MR and TR. Today, she states that she is feeling much better.   She was accompanied by her daughter with whom she lives and she agrees that her mother is doing much better. She states that she has more energy and her appetite has improved. She denies chest pain, tightness, heaviness, and palpitations. She denies shortness of breath at rest, dyspnea on exertion, denies orthopnea and PND. The cough has resolved. She states that the abdominal bloating has markedly improved. She denies lightheadedness, dizziness, and syncope. She admits to mild lower extremity edema which has markedly improved. She denies claudication. Denies nausea, vomiting, diarrhea, fever, chills. Past Medical History:  
Diagnosis Date  Anemia  Arrhythmia Atrial Fibrillation  Atrial fibrillation ([address]   
 pt refused anticoagulation  [facility]congestive heart failure) [facility]  Coronary artery disease  Diabetes ([facility]  Diabetes mellitus ([facility]  Echocardiogram abnormal 11/20/2015 EF 55%. No WMA. Indeterminate diastolic fx. RVSP 40-45 mmHg. Mod LAE. Mild-mod NOMI. Mild-mod MR. Mod TR.    
 Holter monitor, abnormal 07/05/2013 Abnormal 48-hr Holter study. Atrial fibrillation w/slow ventricular rate, avg HR 50 bpm.  Hundreds of pauses > 3.5 sec, longest 7.4 sec.  HTN (hypertension)  Hyperlipidemia  Lower extremity venous duplex 12/16/2014 No DVT bilaterally. Bilateral pulsatile flow c/w elevated central venous pressure.  Paroxysmal atrial fibrillation (HCC)  Pulmonary HTN ([facility]  S/P cardiac pacemaker procedure 10-31-13 Implantation of MEDTRONIC single-chamber permanent pacemaker  Syncope 2/28/2011 Past Surgical History:  
Procedure Laterality Date  HX HYSTERECTOMY  HX PACEMAKER Family History Problem Relation Age of Onset  Heart Attack Father Social History Tobacco Use  Smoking status: Never Smoker  Smokeless tobacco: Never Used Substance Use Topics  Alcohol use: No  
 Drug use: Not on file Current medications: 
Current Outpatient Medications Medication Sig  
 ferrous sulfate (IRON) 325 mg (65 mg iron) tablet Take  by mouth Daily (before breakfast).  furosemide (LASIX) 40 mg tablet Take 1 tab po BID  pantoprazole (PROTONIX) 40 mg tablet Take 1 Tab by mouth Daily (before breakfast).  glipiZIDE SR (GLUCOTROL XL) 5 mg CR tablet Take 1 Tab by mouth daily.  spironolactone (ALDACTONE) 25 mg tablet Take one tablet by mouth daily  metoprolol tartrate (LOPRESSOR) 50 mg tablet Take 1 Tab by mouth two (2) times a day.  potassium chloride SR (KLOR-CON 10) 10 mEq tablet Take 1 Tab by mouth daily.  TRADJENTA 5 mg tablet Take 5 mg by mouth two (2) times a day.  aspirin 81 mg chewable tablet Take one tab daily-START 11/8/13  conjugated estrogens (PREMARIN) 0.3 mg tablet Take 0.3 mg by mouth daily.  folic acid (FOLVITE) 1 mg tablet Take 1 mg by mouth daily. No current facility-administered medications for this visit. No Known Allergies Physical: 
Visit Vitals /72 Pulse 69 Ht 5' 1\" (1.549 m) Wt 41.7 kg (92 lb) SpO2 97% BMI 17.38 kg/m² Her weight is down 12  pounds since her last visit. Neck:  Supple, no JVD, no carotid bruits CV:  Normal S1 and  S2, grade II-II/VI PHOEBE noted, no rubs or gallops noted. Lungs: Breath sounds are clear bilaterally, no rales or wheezing Abd:  Soft, non-tender, slightly distended with good bowel sounds. No hepatosplenomegaly Extremities:  Trace lower extremity  edema EKG:  Read by Franklyn Turcios MD - Probable underlying sinus rhythm with 100% v-pacing LABS: 
Lab Results Component Value Date/Time Sodium 141 03/10/2019 03:12 AM  
 Potassium 3.9 03/10/2019 03:12 AM  
 Chloride 107 03/10/2019 03:12 AM  
 CO2 26 03/10/2019 03:12 AM  
 Glucose 88 03/10/2019 03:12 AM  
 BUN 35 (H) 03/10/2019 03:12 AM  
 Creatinine 1.04 03/10/2019 03:12 AM  
 
Lab Results Component Value Date/Time Cholesterol, total 267 (H) 08/15/2017 12:09 PM  
 HDL Cholesterol 61 (H) 08/15/2017 12:09 PM  
 LDL, calculated 131.2 (H) 08/15/2017 12:09 PM  
 Triglyceride 374 (H) 08/15/2017 12:09 PM  
 CHOL/HDL Ratio 4.4 08/15/2017 12:09 PM  
 
Impression / Plan: 1. Atrial fibrillation, chronic 2. Sick sinus syndrome with tachy/bryan manifestations, s/p permanent pacemaker implant on 10/31/13 3. Hypertension, blood pressure stable 4. Diabetes, recommend Hgb A1c less than 7% from cardiac standpoint  (last Hgb A1 c was 9.3 in March 2019) 5. CHF, chronic diastolic, appears compensated Mrs. Rosemarie Rae was seen today a one month CHF follow-up. During her appointment with Dr. Juliann White on 3/7/19, her lasix was increased to 80mg in the AM and 40mg in the PM due to marked weight increase (16 pounds) and lower extremity edema. A NT pro-BNP was done and it was 2836. The following day, on March 8, 2019, she was admitted for anemia secondary to GI bleed and was hospitalized for about 3 days. She was originally sent to the emergency room for blood glucose level greater than 500 and while there, she complained of fatigue and dark-colored stools which has been occurring for about 1 month. Her hemoglobin was 7.2 and a repeat was 6.4 and she ultimately received 1 unit of packed red blood cells. According to her daughter, she was diuresed while she was in the hospital.  We were not consulted to follow her during her stay. Since her last visit, her weight is down 12 pounds. She is feeling much better and has noted marked improvement in her lower extremity edema. She states that she has been weighing at home on a daily basis and her weight at home has been 87 pounds. She offers no complaints of chest pain or shortness of breath. Her breath sounds are clear and she has trace lower extremity edema. At this time, no changes were made to her medication regimen.   Upon discharge from the hospital, her Lasix was changed back to 40 mg twice a day and this is what she has been taking. Congestive heart failure teaching reinforced today. Advised to limit sodium intake to no more than 2000mg per day and to also watch fluid intake. Advised to weigh daily every morning and record weights. Instructed to call our office if progressive weight gain is noted over a 2 to 3 day period of time, shortness of breath increases, or if abdominal bloating, nausea, fatigue, or increased lower extremity edema is noted. She will follow-up with Dr. Andrés Santa as scheduled and PRN. Mei Hackett MSN, FNP-BC Please note:  Portions of this chart were created with Dragon medical speech to text program.  Unrecognized errors may be present.

## 2019-04-25 ENCOUNTER — OFFICE VISIT (OUTPATIENT)
Dept: CARDIOLOGY CLINIC | Age: 84
End: 2019-04-25

## 2019-04-25 VITALS
DIASTOLIC BLOOD PRESSURE: 72 MMHG | SYSTOLIC BLOOD PRESSURE: 132 MMHG | HEIGHT: 61 IN | BODY MASS INDEX: 17.37 KG/M2 | OXYGEN SATURATION: 97 % | WEIGHT: 92 LBS | HEART RATE: 69 BPM

## 2019-04-25 DIAGNOSIS — I50.32 CHRONIC DIASTOLIC CONGESTIVE HEART FAILURE (HCC): ICD-10-CM

## 2019-04-25 DIAGNOSIS — Z95.0 CARDIAC PACEMAKER IN SITU: ICD-10-CM

## 2019-04-25 DIAGNOSIS — I48.20 CHRONIC ATRIAL FIBRILLATION (HCC): Primary | ICD-10-CM

## 2019-04-25 DIAGNOSIS — I34.0 MITRAL VALVE INSUFFICIENCY, UNSPECIFIED ETIOLOGY: ICD-10-CM

## 2019-04-25 DIAGNOSIS — I49.5 SSS (SICK SINUS SYNDROME) (HCC): ICD-10-CM

## 2019-04-25 RX ORDER — LANOLIN ALCOHOL/MO/W.PET/CERES
CREAM (GRAM) TOPICAL
COMMUNITY
End: 2019-05-22 | Stop reason: ALTCHOICE

## 2019-04-25 NOTE — PATIENT INSTRUCTIONS
Continue present medication regimen Follow-up with Dr Karrie Harman as scheduled and as needed Weigh daily and record Limit sodium intake to 2000mg per day Limit fluid intake to no more than  6, eight ounce glasses of any type of fluids per day (total of 48 ounces per day) Call if you notice sudden or progressive weight gain (3-5 pounds in 2-3 days), increasing shortness of breath, abdominal bloating, increasing lower extremity edema, inability to lie flat or on your normal number of pillows, having to sit up to catch your breath, fatigue, increased somnolence (sleeping more), poor appetite

## 2019-05-22 ENCOUNTER — OFFICE VISIT (OUTPATIENT)
Dept: CARDIOLOGY CLINIC | Age: 84
End: 2019-05-22

## 2019-05-22 ENCOUNTER — HOSPITAL ENCOUNTER (OUTPATIENT)
Dept: LAB | Age: 84
Discharge: HOME | End: 2019-05-22
Payer: MEDICARE

## 2019-05-22 VITALS
HEART RATE: 82 BPM | OXYGEN SATURATION: 98 % | HEIGHT: 61 IN | BODY MASS INDEX: 16.99 KG/M2 | SYSTOLIC BLOOD PRESSURE: 145 MMHG | DIASTOLIC BLOOD PRESSURE: 60 MMHG | WEIGHT: 90 LBS

## 2019-05-22 DIAGNOSIS — I50.32 CHRONIC DIASTOLIC CONGESTIVE HEART FAILURE (HCC): Primary | ICD-10-CM

## 2019-05-22 DIAGNOSIS — R06.02 SOB (SHORTNESS OF BREATH): ICD-10-CM

## 2019-05-22 DIAGNOSIS — I50.32 CHRONIC DIASTOLIC CONGESTIVE HEART FAILURE (HCC): ICD-10-CM

## 2019-05-22 DIAGNOSIS — I48.20 CHRONIC ATRIAL FIBRILLATION (HCC): ICD-10-CM

## 2019-05-22 DIAGNOSIS — E11.21 TYPE 2 DIABETES WITH NEPHROPATHY (HCC): ICD-10-CM

## 2019-05-22 DIAGNOSIS — Z95.0 CARDIAC PACEMAKER IN SITU: ICD-10-CM

## 2019-05-22 DIAGNOSIS — I34.0 MITRAL VALVE INSUFFICIENCY, UNSPECIFIED ETIOLOGY: ICD-10-CM

## 2019-05-22 DIAGNOSIS — I49.5 SSS (SICK SINUS SYNDROME) (HCC): ICD-10-CM

## 2019-05-22 DIAGNOSIS — E78.5 HYPERLIPIDEMIA, UNSPECIFIED HYPERLIPIDEMIA TYPE: ICD-10-CM

## 2019-05-22 LAB
ANION GAP SERPL CALC-SCNC: 7 MMOL/L (ref 3–18)
BNP SERPL-MCNC: 1041 PG/ML (ref 0–1800)
BUN SERPL-MCNC: 50 MG/DL (ref 7–18)
BUN/CREAT SERPL: 33 (ref 12–20)
CALCIUM SERPL-MCNC: 9.3 MG/DL (ref 8.5–10.1)
CHLORIDE SERPL-SCNC: 101 MMOL/L (ref 100–108)
CO2 SERPL-SCNC: 28 MMOL/L (ref 21–32)
CREAT SERPL-MCNC: 1.53 MG/DL (ref 0.6–1.3)
ERYTHROCYTE [DISTWIDTH] IN BLOOD BY AUTOMATED COUNT: 14.3 % (ref 11.6–14.5)
GLUCOSE SERPL-MCNC: 241 MG/DL (ref 74–99)
HCT VFR BLD AUTO: 31.5 % (ref 35–45)
HGB BLD-MCNC: 10.4 G/DL (ref 12–16)
MCH RBC QN AUTO: 31.8 PG (ref 24–34)
MCHC RBC AUTO-ENTMCNC: 33 G/DL (ref 31–37)
MCV RBC AUTO: 96.3 FL (ref 74–97)
PLATELET # BLD AUTO: 177 K/UL (ref 135–420)
PMV BLD AUTO: 9.5 FL (ref 9.2–11.8)
POTASSIUM SERPL-SCNC: 3.8 MMOL/L (ref 3.5–5.5)
RBC # BLD AUTO: 3.27 M/UL (ref 4.2–5.3)
SODIUM SERPL-SCNC: 136 MMOL/L (ref 136–145)
WBC # BLD AUTO: 6.4 K/UL (ref 4.6–13.2)

## 2019-05-22 PROCEDURE — 85027 COMPLETE CBC AUTOMATED: CPT

## 2019-05-22 PROCEDURE — 80048 BASIC METABOLIC PNL TOTAL CA: CPT

## 2019-05-22 PROCEDURE — 36415 COLL VENOUS BLD VENIPUNCTURE: CPT

## 2019-05-22 PROCEDURE — 83880 ASSAY OF NATRIURETIC PEPTIDE: CPT

## 2019-05-22 NOTE — PROGRESS NOTES
HPI: I saw Mehnaz Shirley in my office today in cardiovascular evaluation regarding her past problems with persistent atrial fibrillation and some degree of mitral regurgitation.  Ms. Rachel Guzman is a very pleasant, small, 80 year old [place] female with history of persistent atrial fibrillation,sick sinus syndrome and symptomatic bradycardia, with syncope, status post a ventricular pacemaker in the past.  She had developed what appeared to be fairly severe pulmonary hypertension over the years, thought to be related to chronic diastolic heart failure in the setting of chronic atrial fibrillation, hypertension and mitral regurgitation made worse by ventricular pacing.     We did do an echocardiogram on October 10, 2018 and that study demonstrated normal overall left ventricular function with moderate mitral valve regurgitation and mild to moderate pulmonary hypertension. However, study was fairly similar to her study back in 2015 suggesting that her mitral regurgitation issues were stable.     When I saw her in March 2019 she seemed to be doing reasonably well but she had developed a lot of peripheral edema and a 16 pound weight gain from the time I seen her in August 2018. I decided to increase her Lasix from 40 mg twice a day to 80 mg in morning and 40 mg at night and I did add Zaroxolyn 5 mg twice a week. She actually went into the emergency room the day after her office visit due to very high blood sugar and significant anemia and she received a unit of red blood cells and had her medications further adjusted her weight ultimately went down about 12 pounds from her visit on March 7, 2019 and is down another 2 pounds today to 90 pounds. She comes into the office today with her daughter and tells me that she is really doing quite well. She is staying active and her daughter actually says she is walking on treadmill and denies any cardiovascular symptomatology at this time.     Encounter Diagnoses   Name Primary?  Chronic diastolic congestive heart failure (HCC) Yes    SOB (shortness of breath) mild with exertion     SSS (sick sinus syndrome) (HCC)     Cardiac pacemaker in situ     Chronic atrial fibrillation (HCC)     Type 2 diabetes with nephropathy (HCC)     Hyperlipidemia, unspecified hyperlipidemia type     Mitral valve insufficiency, mild to moderate        Discussion: This lady appears to be doing about as well as we could expect and I really have no recommendations for change at this time. However, her diastolic heart failure appears to be well controlled today I had like to recheck her BMP and BNP so that we can further decide on any adjustment of her diuretic therapy moving forward. She does have history of anemia from some GI blood loss that prompted an admission in March 2019 and has not been taking her iron consistently so I am going to check her CBC as well to be sure that she is not having problems with persistent anemia. We checked her pacemaker couple months ago and it was functioning normally with 3 years remaining on the battery and since she is otherwise doing well currently I am simply going to see her again in several months. I should note that her blood pressure was somewhat elevated today at 144/78 when checked by my staff and I checked again myself and got 145/60, but since is very minimally elevated I am not going to make any changes in her medications at this time. PCP: Chacorta Bowling MD       Past Medical History:   Diagnosis Date    Anemia     Arrhythmia     Atrial Fibrillation     Atrial fibrillation [facility])     pt refused anticoagulation    [facility]     Coronary artery disease     Diabetes ([facility]     Diabetes mellitus ([facility]     Echocardiogram abnormal 11/20/2015    EF 55%. No WMA. Indeterminate diastolic fx. RVSP 40-45 mmHg. Mod LAE. Mild-mod NOMI. Mild-mod MR.   Mod TR.      Holter monitor, abnormal 07/05/2013    Abnormal 48-hr Holter study. Atrial fibrillation w/slow ventricular rate, avg HR 50 bpm.  Hundreds of pauses > 3.5 sec, longest 7.4 sec.  HTN (hypertension)     Hyperlipidemia     Lower extremity venous duplex 12/16/2014    No DVT bilaterally. Bilateral pulsatile flow c/w elevated central venous pressure.  Paroxysmal atrial fibrillation (HCC)     Pulmonary HTN ([address]     S/P cardiac pacemaker procedure 10-31-13    Implantation of [employer]TR[employer]IC single-chamber permanent pacemaker    Syncope 2/28/2011       Past Surgical History:   Procedure Laterality Date    HX HYSTERECTOMY      HX PACEMAKER         Current Outpatient Medications   Medication Sig    furosemide (LASIX) 40 mg tablet Take 1 tab po BID    pantoprazole (PROTONIX) 40 mg tablet Take 1 Tab by mouth Daily (before breakfast).  glipiZIDE SR (GLUCOTROL XL) 5 mg CR tablet Take 1 Tab by mouth daily.  spironolactone (ALDACTONE) 25 mg tablet Take one tablet by mouth daily    metoprolol tartrate (LOPRESSOR) 50 mg tablet Take 1 Tab by mouth two (2) times a day.  potassium chloride SR (KLOR-CON 10) 10 mEq tablet Take 1 Tab by mouth daily.  TRADJENTA 5 mg tablet Take 5 mg by mouth two (2) times a day.  aspirin 81 mg chewable tablet Take one tab daily-START 11/8/13    conjugated estrogens (PREMARIN) 0.3 mg tablet Take 0.3 mg by mouth daily.  folic acid (FOLVITE) 1 mg tablet Take 1 mg by mouth daily. No current facility-administered medications for this visit. No Known Allergies    Social History :  Social History     Tobacco Use    Smoking status: Never Smoker    Smokeless tobacco: Never Used   Substance Use Topics    Alcohol use: No        Family History: family history includes Heart Attack in her father. Review of Systems   Constitutional: Negative. Respiratory: Negative. Cardiovascular: Positive for orthopnea. Negative for chest pain, leg swelling, and palpitations. Gastrointestinal: Negative.     Musculoskeletal: Negative. Neurological: Negative for dizziness. Physical Exam:    The patient is a cooperative, alert, well developed, well nourished, small  80 y.o. oriental female who is in no acute distress at the time of the examination. Visit Vitals  /60 (BP 1 Location: Right arm, BP Patient Position: Sitting)   Pulse 82   Ht 5' 1\" (1.549 m)   Wt 40.8 kg (90 lb)   SpO2 98%   BMI 17.01 kg/m²     HEENT: Conjuctiva white, mucosa moist, no pallor or cyanosis. NECK: Supple without masses, tenderness or thyromegaly. There was no jugular venous distention. Carotid are full bilaterally without bruits. CHEST: Symmetrical with good excursion. LUNGS: Clear to auscultation in all fields  HEART: The apex is not displaced. There were no thrills, lifts, or heaves. There is a normal S1 and mildly increased S2 with a grade II/VI systolic ejection murmur heard at the apex along the sternal and into the pulmonic area as well as a grade I-II/VI apical plateau shaped murmur with some radiation to the axilla without appreciable diastolic murmurs, rubs, clicks, or gallops auscultated. ABDOMEN: Soft without masses, tenderness or organomegaly. EXTREMITIES: Full peripheral pulses without peripheral edema. Review of Data: See PMH and Cardiology and Imaging sections for cardiac testing  Lab Results   Component Value Date/Time    Cholesterol, total 267 (H) 08/15/2017 12:09 PM    HDL Cholesterol 61 (H) 08/15/2017 12:09 PM    LDL, calculated 131.2 (H) 08/15/2017 12:09 PM    Triglyceride 374 (H) 08/15/2017 12:09 PM    CHOL/HDL Ratio 4.4 08/15/2017 12:09 PM       Results for orders placed or performed in visit on 04/25/19   AMB POC EKG ROUTINE W/ 12 LEADS, INTER & REP     Status: None    Narrative    Read by Kings Romo MD - Probable underlying sinus rhythm with 100% v-pacing. Samuel John D.O., F.A.C.C. [facility]  [address] [place] [address] and [facility]  [address] [place] [address].   [address] Bibi Aragon    PLEASE NOTE:  This document has been produced using voice recognition software. Unrecognized errors in transcription may be present.

## 2019-05-23 NOTE — PROGRESS NOTES
She is significantly pre-renal with some renal insufficiency as well and no CHF with NT pro-BNP down to 1041. I would decrease Lasix to 40 mg daily and repeat BMP and BNP in 2-3 weeks. Please let the patient and or her daughter know.  ES

## 2019-05-30 ENCOUNTER — TELEPHONE (OUTPATIENT)
Dept: CARDIOLOGY CLINIC | Age: 84
End: 2019-05-30

## 2019-05-30 DIAGNOSIS — R06.02 SOB (SHORTNESS OF BREATH): ICD-10-CM

## 2019-05-30 DIAGNOSIS — I50.32 CHRONIC DIASTOLIC CONGESTIVE HEART FAILURE (HCC): Primary | ICD-10-CM

## 2019-05-30 NOTE — TELEPHONE ENCOUNTER
----- Message from Arvilla Lefort, DO sent at 5/23/2019 10:20 AM EDT ----- She is significantly pre-renal with some renal insufficiency as well and no CHF with NT pro-BNP down to 1041. I would decrease Lasix to 40 mg daily and repeat BMP and BNP in 2-3 weeks. Please let the patient and or her daughter know.  ES

## 2019-05-30 NOTE — TELEPHONE ENCOUNTER
----- Message from Jesus Rae DO sent at 5/23/2019 10:20 AM EDT ----- She is significantly pre-renal with some renal insufficiency as well and no CHF with NT pro-BNP down to 1041. I would decrease Lasix to 40 mg daily and repeat BMP and BNP in 2-3 weeks. Please let the patient and or her daughter know.  ES

## 2019-07-26 ENCOUNTER — HOSPITAL ENCOUNTER (OUTPATIENT)
Dept: LAB | Age: 84
Discharge: HOME | End: 2019-07-26
Payer: MEDICARE

## 2019-07-26 DIAGNOSIS — Z79.890 NEED FOR PROPHYLACTIC HORMONE REPLACEMENT THERAPY (POSTMENOPAUSAL): ICD-10-CM

## 2019-07-26 DIAGNOSIS — E11.9 DIABETES MELLITUS (HCC): ICD-10-CM

## 2019-07-26 DIAGNOSIS — D53.9 MACROCYTIC ANEMIA: ICD-10-CM

## 2019-07-26 LAB
ALBUMIN SERPL-MCNC: 3.8 G/DL (ref 3.4–5)
ALBUMIN/GLOB SERPL: 0.9 {RATIO} (ref 0.8–1.7)
ALP SERPL-CCNC: 99 U/L (ref 45–117)
ALT SERPL-CCNC: 13 U/L (ref 13–56)
ANION GAP SERPL CALC-SCNC: 7 MMOL/L (ref 3–18)
AST SERPL-CCNC: 17 U/L (ref 10–38)
BILIRUB SERPL-MCNC: 0.9 MG/DL (ref 0.2–1)
BUN SERPL-MCNC: 34 MG/DL (ref 7–18)
BUN/CREAT SERPL: 27 (ref 12–20)
CALCIUM SERPL-MCNC: 9.3 MG/DL (ref 8.5–10.1)
CHLORIDE SERPL-SCNC: 104 MMOL/L (ref 100–111)
CHOLEST SERPL-MCNC: 156 MG/DL
CO2 SERPL-SCNC: 29 MMOL/L (ref 21–32)
CREAT SERPL-MCNC: 1.25 MG/DL (ref 0.6–1.3)
ERYTHROCYTE [DISTWIDTH] IN BLOOD BY AUTOMATED COUNT: 14.2 % (ref 11.6–14.5)
GLOBULIN SER CALC-MCNC: 4.1 G/DL (ref 2–4)
GLUCOSE SERPL-MCNC: 115 MG/DL (ref 74–99)
HBA1C MFR BLD: 7.7 % (ref 4.2–5.6)
HCT VFR BLD AUTO: 32.8 % (ref 35–45)
HDLC SERPL-MCNC: 66 MG/DL (ref 40–60)
HDLC SERPL: 2.4 {RATIO} (ref 0–5)
HGB BLD-MCNC: 10.5 G/DL (ref 12–16)
LDLC SERPL CALC-MCNC: 73.2 MG/DL (ref 0–100)
LIPID PROFILE,FLP: ABNORMAL
MCH RBC QN AUTO: 32.2 PG (ref 24–34)
MCHC RBC AUTO-ENTMCNC: 32 G/DL (ref 31–37)
MCV RBC AUTO: 100.6 FL (ref 74–97)
PLATELET # BLD AUTO: 83 K/UL (ref 135–420)
PMV BLD AUTO: 11.2 FL (ref 9.2–11.8)
POTASSIUM SERPL-SCNC: 4.3 MMOL/L (ref 3.5–5.5)
PROT SERPL-MCNC: 7.9 G/DL (ref 6.4–8.2)
RBC # BLD AUTO: 3.26 M/UL (ref 4.2–5.3)
SODIUM SERPL-SCNC: 140 MMOL/L (ref 136–145)
TRIGL SERPL-MCNC: 84 MG/DL (ref ?–150)
TSH SERPL DL<=0.05 MIU/L-ACNC: 9.65 UIU/ML (ref 0.36–3.74)
VLDLC SERPL CALC-MCNC: 16.8 MG/DL
WBC # BLD AUTO: 5.8 K/UL (ref 4.6–13.2)

## 2019-07-26 PROCEDURE — 83036 HEMOGLOBIN GLYCOSYLATED A1C: CPT

## 2019-07-26 PROCEDURE — 85027 COMPLETE CBC AUTOMATED: CPT

## 2019-07-26 PROCEDURE — 80061 LIPID PANEL: CPT

## 2019-07-26 PROCEDURE — 80053 COMPREHEN METABOLIC PANEL: CPT

## 2019-07-26 PROCEDURE — 84443 ASSAY THYROID STIM HORMONE: CPT

## 2019-07-26 PROCEDURE — 36415 COLL VENOUS BLD VENIPUNCTURE: CPT

## 2019-12-03 RX ORDER — METOPROLOL TARTRATE 50 MG/1
50 TABLET ORAL 2 TIMES DAILY
Qty: 180 TAB | Refills: 3 | Status: SHIPPED | OUTPATIENT
Start: 2019-12-03 | End: 2020-12-02

## 2020-03-01 ENCOUNTER — HOSPITAL ENCOUNTER (EMERGENCY)
Age: 85
Discharge: HOME | End: 2020-03-01
Attending: EMERGENCY MEDICINE
Payer: MEDICARE

## 2020-03-01 ENCOUNTER — APPOINTMENT (OUTPATIENT)
Dept: GENERAL RADIOLOGY | Age: 85
End: 2020-03-01
Attending: STUDENT IN AN ORGANIZED HEALTH CARE EDUCATION/TRAINING PROGRAM
Payer: MEDICARE

## 2020-03-01 VITALS
BODY MASS INDEX: 19.04 KG/M2 | RESPIRATION RATE: 14 BRPM | SYSTOLIC BLOOD PRESSURE: 127 MMHG | HEIGHT: 60 IN | HEART RATE: 61 BPM | OXYGEN SATURATION: 100 % | DIASTOLIC BLOOD PRESSURE: 61 MMHG | TEMPERATURE: 97.3 F | WEIGHT: 97 LBS

## 2020-03-01 DIAGNOSIS — S81.811A LACERATION OF RIGHT LOWER EXTREMITY, INITIAL ENCOUNTER: Primary | ICD-10-CM

## 2020-03-01 LAB
GLUCOSE BLD STRIP.AUTO-MCNC: 306 MG/DL (ref 70–110)
GLUCOSE BLD STRIP.AUTO-MCNC: 307 MG/DL (ref 70–110)

## 2020-03-01 PROCEDURE — 73590 X-RAY EXAM OF LOWER LEG: CPT

## 2020-03-01 PROCEDURE — 99285 EMERGENCY DEPT VISIT HI MDM: CPT

## 2020-03-01 PROCEDURE — 82962 GLUCOSE BLOOD TEST: CPT

## 2020-03-01 PROCEDURE — 74011250637 HC RX REV CODE- 250/637: Performed by: STUDENT IN AN ORGANIZED HEALTH CARE EDUCATION/TRAINING PROGRAM

## 2020-03-01 PROCEDURE — 90471 IMMUNIZATION ADMIN: CPT

## 2020-03-01 PROCEDURE — 90715 TDAP VACCINE 7 YRS/> IM: CPT | Performed by: STUDENT IN AN ORGANIZED HEALTH CARE EDUCATION/TRAINING PROGRAM

## 2020-03-01 PROCEDURE — 74011250636 HC RX REV CODE- 250/636: Performed by: STUDENT IN AN ORGANIZED HEALTH CARE EDUCATION/TRAINING PROGRAM

## 2020-03-01 PROCEDURE — 99284 EMERGENCY DEPT VISIT MOD MDM: CPT

## 2020-03-01 RX ORDER — CEPHALEXIN 250 MG/1
250 CAPSULE ORAL EVERY 8 HOURS
Status: DISCONTINUED | OUTPATIENT
Start: 2020-03-01 | End: 2020-03-02 | Stop reason: HOSPADM

## 2020-03-01 RX ORDER — HYDROMORPHONE HYDROCHLORIDE 2 MG/1
0.5 TABLET ORAL
Status: DISCONTINUED | OUTPATIENT
Start: 2020-03-01 | End: 2020-03-02 | Stop reason: HOSPADM

## 2020-03-01 RX ORDER — CEPHALEXIN 250 MG/1
250 CAPSULE ORAL EVERY 8 HOURS
Qty: 21 CAP | Refills: 0 | Status: SHIPPED | OUTPATIENT
Start: 2020-03-01 | End: 2020-03-08

## 2020-03-01 RX ORDER — TRAMADOL HYDROCHLORIDE 50 MG/1
50 TABLET ORAL
Qty: 8 TAB | Refills: 0 | Status: SHIPPED | OUTPATIENT
Start: 2020-03-01 | End: 2020-03-04

## 2020-03-01 RX ADMIN — HYDROMORPHONE HYDROCHLORIDE 0.5 MG: 2 TABLET ORAL at 17:30

## 2020-03-01 RX ADMIN — CEPHALEXIN 250 MG: 250 CAPSULE ORAL at 17:26

## 2020-03-01 RX ADMIN — TETANUS TOXOID, REDUCED DIPHTHERIA TOXOID AND ACELLULAR PERTUSSIS VACCINE, ADSORBED 0.5 ML: 5; 2.5; 8; 8; 2.5 SUSPENSION INTRAMUSCULAR at 20:26

## 2020-03-01 NOTE — ED PROVIDER NOTES
EMERGENCY DEPARTMENT HISTORY AND PHYSICAL EXAM    3:55 PM      Date: 3/1/2020  Patient Name: Mery Marcano    History of Presenting Illness     No chief complaint on file. History Provided By: Patient  Location/Duration/Severity/Modifying factors   Pt with hx of CHF, DM, HTN presenting after fall. Pt was walking with daughter, tripped, daughter fell onto her, she hit leg on concrete step. Pt went upstairs, applied bandage and tourniquet and called EMS. She was able to ambulate. Pt's pain is well controlled. She reports no other injuries, she has no HA, no other bleeding. She is on ASA daily. PCP: Jessika Brooks MD    Current Outpatient Medications   Medication Sig Dispense Refill    metoprolol tartrate (LOPRESSOR) 50 mg tablet Take 1 Tab by mouth two (2) times a day. 180 Tab 3    furosemide (LASIX) 40 mg tablet Take 1 tab po  Tab 6    pantoprazole (PROTONIX) 40 mg tablet Take 1 Tab by mouth Daily (before breakfast). 30 Tab 0    glipiZIDE SR (GLUCOTROL XL) 5 mg CR tablet Take 1 Tab by mouth daily. 30 Tab 0    spironolactone (ALDACTONE) 25 mg tablet Take one tablet by mouth daily 30 Tab 6    potassium chloride SR (KLOR-CON 10) 10 mEq tablet Take 1 Tab by mouth daily. 1 Tab 0    TRADJENTA 5 mg tablet Take 5 mg by mouth two (2) times a day.  aspirin 81 mg chewable tablet Take one tab daily-START 11/8/13 30 Tab 0    conjugated estrogens (PREMARIN) 0.3 mg tablet Take 0.3 mg by mouth daily.  folic acid (FOLVITE) 1 mg tablet Take 1 mg by mouth daily. Past History     Past Medical History:  Past Medical History:   Diagnosis Date    Anemia     Arrhythmia     Atrial Fibrillation     Atrial fibrillation [facility])     pt refused anticoagulation    CHF (congestive heart failure) (HCC)     Coronary artery disease     Diabetes ([facility]     Diabetes mellitus ([facility]     Echocardiogram abnormal 11/20/2015    EF 55%. No WMA. Indeterminate diastolic fx. RVSP 40-45 mmHg. Mod LAE. Mild-mod NOMI. Mild-mod MR. Mod TR.      Holter monitor, abnormal 07/05/2013    Abnormal 48-hr Holter study. Atrial fibrillation w/slow ventricular rate, avg HR 50 bpm.  Hundreds of pauses > 3.5 sec, longest 7.4 sec.  HTN (hypertension)     Hyperlipidemia     Lower extremity venous duplex 12/16/2014    No DVT bilaterally. Bilateral pulsatile flow c/w elevated central venous pressure.  Paroxysmal atrial fibrillation (HCC)     Pulmonary HTN ([address]     S/P cardiac pacemaker procedure 10-31-13    Implantation of [employer] single-chamber permanent pacemaker    Syncope 2/28/2011       Past Surgical History:  Past Surgical History:   Procedure Laterality Date    HX HYSTERECTOMY      HX PACEMAKER         Family History:  Family History   Problem Relation Age of Onset    Heart Attack Father        Social History:  Social History     Tobacco Use    Smoking status: Never Smoker    Smokeless tobacco: Never Used   Substance Use Topics    Alcohol use: No    Drug use: Not on file       Allergies:  No Known Allergies    Review of Systems     Review of Systems   Constitutional: Negative for activity change, chills and fever. HENT: Negative for congestion and sinus pain. Eyes: Negative for photophobia and visual disturbance. Respiratory: Negative for cough, chest tightness and shortness of breath. Cardiovascular: Negative for chest pain, palpitations and leg swelling. Gastrointestinal: Negative for abdominal distention and abdominal pain. Genitourinary: Negative for hematuria. Musculoskeletal: Negative for arthralgias and gait problem. Skin: Positive for wound. Negative for pallor and rash. Neurological: Negative for dizziness, weakness, light-headedness, numbness and headaches. Psychiatric/Behavioral: Negative for agitation, behavioral problems and confusion. Physical Exam   There were no vitals taken for this visit.     Physical Exam  Constitutional:       General: She is not in acute distress. Appearance: Normal appearance. She is not ill-appearing, toxic-appearing or diaphoretic. HENT:      Head: Normocephalic and atraumatic. Neck:      Musculoskeletal: Normal range of motion. Cardiovascular:      Pulses: Normal pulses. Pulmonary:      Effort: Pulmonary effort is normal.   Abdominal:      General: Abdomen is flat. Palpations: Abdomen is soft. Musculoskeletal: Normal range of motion. General: Signs of injury present. Left lower leg: No edema. Skin:     Findings: Lesion present. Comments: Mostly full thickness evisceration R shin. Neurological:      Mental Status: She is alert. Diagnostic Study Results     Labs -  No results found for this or any previous visit (from the past 12 hour(s)). Radiologic Studies -   No orders to display         Medical Decision Making   I am the first provider for this patient. I reviewed the vital signs, available nursing notes, past medical history, past surgical history, family history and social history. Vital Signs-Reviewed the patient's vital signs. Records Reviewed: Old Medical Records (Time of Review: 3:55 PM)    ED Course: Progress Notes, Reevaluation, and Consults:         Provider Notes (Medical Decision Making):   MDM  Number of Diagnoses or Management Options  Laceration of right lower extremity, initial encounter:   Diagnosis management comments: Concern for open fx. XR wnl. Unknown tetanus status. High infection risk w/ mechanism and DM. Tetanus vaccine and antibiotics given. Repaired with steri-strips, tefla,  Will refer to wound care for further eval and treat.  checked, no Rx. Advised to take Tylenol and then Tramadol for breakthrough pain.       Wound Closure by Adhesive  Date/Time: 3/1/2020 7:40 PM  Performed by: Angel Keating DO  Authorized by: Angel Keating DO     Consent:     Consent obtained:  Verbal    Consent given by:  Patient    Risks discussed:  Pain, poor wound healing, poor cosmetic result, retained foreign body, need for additional repair and infection    Alternatives discussed:  No treatment  Anesthesia (see MAR for exact dosages): Anesthesia method:  None (Dilaudid)  Laceration details:     Location:  Leg    Leg location:  R lower leg  Repair type:     Repair type: Intermediate  Pre-procedure details:     Preparation:  Imaging obtained to evaluate for foreign bodies  Exploration:     Hemostasis achieved with:  Direct pressure    Wound exploration: entire depth of wound probed and visualized      Contaminated: yes    Treatment:     Area cleansed with:  Soap and water    Amount of cleaning:  Extensive    Irrigation solution:  Sterile water    Irrigation method:  Syringe    Visualized foreign bodies/material removed: yes    Skin repair:     Repair method:  Steri-Strips    Number of Steri-Strips:  20  Approximation:     Approximation:  Loose  Post-procedure details:     Dressing:  Non-adherent dressing    Patient tolerance of procedure: Tolerated well, no immediate complications          Diagnosis     Clinical Impression:   1. Laceration of right lower extremity, initial encounter        Disposition: Home with FU    Follow-up Information     Follow up With Specialties Details Why Contact Info    [address] [place] [address] Schedule an appointment as soon as possible for a visit in 1 day  [address] [place] [address] [address],[facility]    Lashae Dugan MD Internal Medicine In 2 days  [address] [place] [address]  [phone]             Discharge Medication List as of 3/1/2020  8:46 PM      START taking these medications    Details   traMADol (ULTRAM) 50 mg tablet Take 1 Tab by mouth every four (4) hours as needed for Pain for up to 3 days. Max Daily Amount: 300 mg., Print, Disp-8 Tab, R-0      cephALEXin (KEFLEX) 250 mg capsule Take 1 Cap by mouth every eight (8) hours for 7 days. , Print, Disp-21 Cap, R-0 CONTINUE these medications which have NOT CHANGED    Details   metoprolol tartrate (LOPRESSOR) 50 mg tablet Take 1 Tab by mouth two (2) times a day., Normal, Disp-180 Tab, R-3      furosemide (LASIX) 40 mg tablet Take 1 tab po BID, Print, Disp-120 Tab, R-6      pantoprazole (PROTONIX) 40 mg tablet Take 1 Tab by mouth Daily (before breakfast). , Print, Disp-30 Tab, R-0      glipiZIDE SR (GLUCOTROL XL) 5 mg CR tablet Take 1 Tab by mouth daily. , Print, Disp-30 Tab, R-0      spironolactone (ALDACTONE) 25 mg tablet Take one tablet by mouth daily, Normal, Disp-30 Tab, R-6      potassium chloride SR (KLOR-CON 10) 10 mEq tablet Take 1 Tab by mouth daily. , No Print, Disp-1 Tab, R-0      TRADJENTA 5 mg tablet Take 5 mg by mouth two (2) times a day., Historical Med, ROSE      aspirin 81 mg chewable tablet Take one tab daily-START 11/8/13, Print, Disp-30 Tab, R-0      conjugated estrogens (PREMARIN) 0.3 mg tablet Take 0.3 mg by mouth daily. , Historical Med      folic acid (FOLVITE) 1 mg tablet Take 1 mg by mouth daily. , Historical Med           Disclaimer: Sections of this note are dictated using utilizing voice recognition software. Minor typographical errors may be present. If questions arise, please do not hesitate to contact me or call our department.

## 2020-03-01 NOTE — ED TRIAGE NOTES
Pt arrived via EMS from home. Pt and daughter walking home from lunch, foot hit a bump on the concrete, pt fell, daughter fell and landed on top of pt. Pt wrapped curtain around bleeding lower right extremity. Skin tear and degloving below patella to above ankle. Curtian removed,  ABD pad, and gauze applied. Vitals 110/70 RR 18, HR 74. Received report from [facility]. Pt A&O x4 and reports falling on the concrete. Denies pain in hips, knees, ankles. Reports pain in right lower extremity 3 out of 10. Denies SOB. Denies chest pain. Skin tear/degloved  below patella to above ankle. Dermis below skin bright red, no active bleeding. Skin tear attached and placement at ankle. Contusions on lower right extremity with blue discoloration. Anterior foot swollen. Pt bilateral movement of toes, bilateral range of motion flexion and extension strong

## 2020-03-02 NOTE — DISCHARGE INSTRUCTIONS

## 2020-03-02 NOTE — ED NOTES
Pt A&O x4. Yoel crackers and water provided at bedside.  and daughter at bedside. Pt and family updated with plan of care. Patient provided blanket and resting in bed with pain 2 out of 10.

## 2020-03-18 RX ORDER — FUROSEMIDE 40 MG/1
TABLET ORAL
Qty: 120 TAB | Refills: 5 | Status: SHIPPED | OUTPATIENT
Start: 2020-03-18 | End: 2020-09-14

## 2020-03-18 RX ORDER — SPIRONOLACTONE 25 MG/1
TABLET ORAL
Qty: 30 TAB | Refills: 5 | Status: SHIPPED | OUTPATIENT
Start: 2020-03-18 | End: 2020-09-14

## 2020-09-14 RX ORDER — FUROSEMIDE 40 MG/1
TABLET ORAL
Qty: 360 TAB | Refills: 4 | Status: SHIPPED | OUTPATIENT
Start: 2020-09-14 | End: 2021-09-28

## 2020-09-14 RX ORDER — SPIRONOLACTONE 25 MG/1
TABLET ORAL
Qty: 90 TAB | Refills: 4 | Status: SHIPPED | OUTPATIENT
Start: 2020-09-14

## 2020-12-02 RX ORDER — METOPROLOL TARTRATE 50 MG/1
TABLET ORAL
Qty: 180 TAB | Refills: 2 | Status: SHIPPED | OUTPATIENT
Start: 2020-12-02 | End: 2021-03-05 | Stop reason: SDUPTHER

## 2021-03-05 RX ORDER — METOPROLOL TARTRATE 50 MG/1
TABLET ORAL
Qty: 180 TAB | Refills: 3 | Status: SHIPPED | OUTPATIENT
Start: 2021-03-05

## 2021-03-05 NOTE — TELEPHONE ENCOUNTER
Patient has not been seen since 5/22/19. Made aware see will need a f/u appt. Appt made for 3/31/21 with Dr. Joseph Guaman.

## (undated) DEVICE — GAUZE SPONGES,16 PLY: Brand: CURITY

## (undated) DEVICE — SYRINGE MED 25GA 3ML L5/8IN SUBQ PLAS W/ DETACH NDL SFTY

## (undated) DEVICE — AIRLIFE™ NASAL OXYGEN CANNULA CURVED, FLARED TIP WITH 14 FOOT (4.3 M) CRUSH-RESISTANT TUBING, OVER-THE-EAR STYLE: Brand: AIRLIFE™

## (undated) DEVICE — BITE BLOCK ENDOSCP UNIV AD 6 TO 9.4 MM

## (undated) DEVICE — ENDOSCOPY PUMP TUBING/ CAP SET: Brand: ERBE

## (undated) DEVICE — STERILE POLYISOPRENE POWDER-FREE SURGICAL GLOVES: Brand: PROTEXIS

## (undated) DEVICE — MEDI-VAC SUCTION HIGH CAPACITY: Brand: CARDINAL HEALTH

## (undated) DEVICE — CATHETER SUCT TR FL TIP 14FR W/ O CTRL

## (undated) DEVICE — MEDI-VAC NON-CONDUCTIVE SUCTION TUBING: Brand: CARDINAL HEALTH

## (undated) DEVICE — BASIN EMESIS 500CC ROSE 250/CS 60/PLT: Brand: MEDEGEN MEDICAL PRODUCTS, LLC

## (undated) DEVICE — SYR 50ML SLIP TIP NSAF LF STRL --

## (undated) DEVICE — FLEX ADVANTAGE 3000CC: Brand: FLEX ADVANTAGE

## (undated) DEVICE — FLUFF AND POLYMER UNDERPAD,EXTRA HEAVY: Brand: WINGS

## (undated) DEVICE — SOLUTION IRRIG 1000ML H2O STRL BLT